# Patient Record
Sex: FEMALE | Race: WHITE | NOT HISPANIC OR LATINO
[De-identification: names, ages, dates, MRNs, and addresses within clinical notes are randomized per-mention and may not be internally consistent; named-entity substitution may affect disease eponyms.]

---

## 2017-03-17 ENCOUNTER — APPOINTMENT (OUTPATIENT)
Dept: VASCULAR SURGERY | Facility: CLINIC | Age: 57
End: 2017-03-17

## 2017-04-12 ENCOUNTER — APPOINTMENT (OUTPATIENT)
Dept: VASCULAR SURGERY | Facility: CLINIC | Age: 57
End: 2017-04-12

## 2017-04-12 VITALS
WEIGHT: 176 LBS | DIASTOLIC BLOOD PRESSURE: 97 MMHG | HEIGHT: 65 IN | TEMPERATURE: 98 F | SYSTOLIC BLOOD PRESSURE: 166 MMHG | HEART RATE: 92 BPM | BODY MASS INDEX: 29.32 KG/M2

## 2017-04-12 DIAGNOSIS — I86.8 VARICOSE VEINS OF OTHER SPECIFIED SITES: ICD-10-CM

## 2017-05-16 ENCOUNTER — RX RENEWAL (OUTPATIENT)
Age: 57
End: 2017-05-16

## 2017-07-07 ENCOUNTER — APPOINTMENT (OUTPATIENT)
Dept: VASCULAR SURGERY | Facility: CLINIC | Age: 57
End: 2017-07-07

## 2017-07-26 ENCOUNTER — APPOINTMENT (OUTPATIENT)
Dept: VASCULAR SURGERY | Facility: CLINIC | Age: 57
End: 2017-07-26

## 2017-07-26 VITALS — DIASTOLIC BLOOD PRESSURE: 91 MMHG | SYSTOLIC BLOOD PRESSURE: 197 MMHG

## 2017-07-26 VITALS — DIASTOLIC BLOOD PRESSURE: 93 MMHG | SYSTOLIC BLOOD PRESSURE: 173 MMHG

## 2017-07-26 VITALS
HEIGHT: 65 IN | HEART RATE: 70 BPM | SYSTOLIC BLOOD PRESSURE: 151 MMHG | DIASTOLIC BLOOD PRESSURE: 85 MMHG | TEMPERATURE: 98.3 F | WEIGHT: 165 LBS | BODY MASS INDEX: 27.49 KG/M2

## 2017-07-26 DIAGNOSIS — Z00.00 ENCOUNTER FOR GENERAL ADULT MEDICAL EXAMINATION W/OUT ABNORMAL FINDINGS: ICD-10-CM

## 2017-11-22 ENCOUNTER — APPOINTMENT (OUTPATIENT)
Dept: VASCULAR SURGERY | Facility: CLINIC | Age: 57
End: 2017-11-22

## 2019-10-11 ENCOUNTER — APPOINTMENT (OUTPATIENT)
Dept: VASCULAR SURGERY | Facility: CLINIC | Age: 59
End: 2019-10-11
Payer: MEDICARE

## 2019-10-11 VITALS
SYSTOLIC BLOOD PRESSURE: 134 MMHG | HEART RATE: 79 BPM | BODY MASS INDEX: 30.82 KG/M2 | DIASTOLIC BLOOD PRESSURE: 92 MMHG | TEMPERATURE: 97.6 F | WEIGHT: 185 LBS | HEIGHT: 65 IN

## 2019-10-11 VITALS — DIASTOLIC BLOOD PRESSURE: 76 MMHG | HEART RATE: 84 BPM | SYSTOLIC BLOOD PRESSURE: 151 MMHG

## 2019-10-11 VITALS — DIASTOLIC BLOOD PRESSURE: 83 MMHG | HEART RATE: 80 BPM | SYSTOLIC BLOOD PRESSURE: 182 MMHG

## 2019-10-11 PROCEDURE — 93923 UPR/LXTR ART STDY 3+ LVLS: CPT

## 2019-10-11 PROCEDURE — 93930 UPPER EXTREMITY STUDY: CPT

## 2019-10-11 PROCEDURE — 99214 OFFICE O/P EST MOD 30 MIN: CPT

## 2019-10-11 RX ORDER — MONTELUKAST SODIUM 10 MG/1
TABLET, FILM COATED ORAL
Refills: 0 | Status: ACTIVE | COMMUNITY

## 2019-10-11 RX ORDER — HYDROCHLOROTHIAZIDE 25 MG/1
25 TABLET ORAL
Refills: 0 | Status: ACTIVE | COMMUNITY

## 2019-10-11 RX ORDER — METOPROLOL TARTRATE 50 MG/1
50 TABLET, FILM COATED ORAL
Refills: 0 | Status: ACTIVE | COMMUNITY

## 2019-10-11 RX ORDER — AMLODIPINE BESYLATE 10 MG/1
10 TABLET ORAL
Refills: 0 | Status: ACTIVE | COMMUNITY

## 2019-10-11 NOTE — DATA REVIEWED
[FreeTextEntry1] : 7/5/2016 carotid Duplex and RETA/PVR to be mono by pt \par \par 7/8/2016 Carotid Duplex Rt ICA greater 80% stenosis Lt ICA 50% stenosis  [facility] flow\par \par 7/8/2016 RETA/PVR mild to mod RLE inflow dz and LLE mild to mod infraing art insuff  Rt reta .53 lt reta .66\par \par 9/21/2016 RLE Venous Duplex no dvt svt\par \par 7/26/2017  Carotid duplex Rt CEA restenosis 40-50% restenosis Lt ICA  less 50% stenosis [facility] flow\par \par 7/26/2017 RETA/PVR  brayan le moderate infragenic art occ dz rt reta .47 lt reta .58\par \par 10/11/2019 RETA/PVR  brayan le moderate infragenic art occ dz rt reta .48 lt reta .61\par \par 10/11/2019 Brayan UE Arterial Duplex  \par                                  Rt sig for  SA stenosis  less 50% stenosis (199cm/min) , \par                                                               Ax A  >75% stenosis (410 cm/min)\par                                  LUE sig for  Subclavian Art stenosis >50% stenosis (360 cm/min)\par \par \par

## 2019-10-11 NOTE — ASSESSMENT
[Arterial/Venous Disease] : arterial/venous disease [Medication Management] : medication management [Foot care/Footwear] : foot care/footwear [FreeTextEntry1] : Impression symptomatic arterial insuff  clinically stable and carotid stenosis and Jonny UE stenosis s/o great vessel dz currently asymptomatic w approx 30mm Hg difference \par \par [employer] management exercise program , protective measures, exercise program \par continue pletal 50 bid \par d/w pt that i will continue to monitor  jonny UE BP  and even though there is a difference in BP since she is asymptomatic will monitor if there are any sx or US findings then will need  CTA of throax  s/o SA stenosis\par  carotid duplex surveillance as per Dr Walker \par ov w reta/pvr s/o art insuff  and  jonny UE art duplex s/o ax a and SA stenosis 8 mo  june /2020\par ov 3 mo to re eval jonny  UE BP check and for sx if any changes will df/w pt to proceed w CTA Thorax s/o stenosis of great vessels \par rto if any changes or new c/o\par \par letter faxed to Dr ИВАН Encinas MD

## 2019-10-11 NOTE — PHYSICAL EXAM
[Normal Breath Sounds] : Normal breath sounds [Left Carotid Bruit] : left carotid bruit heard [Right Carotid Bruit] : right carotid bruit heard [2+] : right 2+ [Ankle Swelling Bilaterally] : bilaterally  [Ankle Swelling (On Exam)] : present [Varicose Veins Of Lower Extremities] : bilaterally [] : bilaterally [Ankle Swelling On The Right] : mild [No HSM] : no hepatosplenomegaly [Alert] : alert [No Rash or Lesion] : No rash or lesion [Oriented to Person] : oriented to person [Oriented to Place] : oriented to place [Oriented to Time] : oriented to time [Calm] : calm [JVD] : no jugular venous distention  [Tender] : was nontender [Abdomen Masses] : No abdominal masses [1+] : left 1+ [de-identified] : nad [Stool Sample Taken] : No stool obtained  on rectal exam [FreeTextEntry1] : Mild arterial insufficiency w mild trophic skin changes \par Mild bilateral eg venous insufficiency \par w mild bilateral leg stasis dermatitis \par and mild bilateral leg edema \par Multiple  bilateral leg small varicose  veins and spider veins calf and shin \par no wounds/ulcers\par no signs of brayan UE ischemia or lesions  [de-identified] : wnl [de-identified] : wnl [de-identified] : wnl [de-identified] : Jonny Cranial nerves 2-12 jonny grossly intact [de-identified] : cooperative

## 2019-10-11 NOTE — HISTORY OF PRESENT ILLNESS
[FreeTextEntry1] : pt c/o brayan le intermittent claudication at several  blocks unchanged from last ov, pt is on pletal  pt was advised to be eval for s/o subclavian artery stenosis  due to brayan ue  bp difference  pt is asymptomatic from ue standpoint  no sx of subclavian steal   [de-identified] : pt has not f/u in 2 years\par pt states Dr MALIHA Walker is performing carotid sureillance\par pt has no cerebrovasc c/o\par brayan ue bp difference remains w lue being lower\par pt c/o brayan le intermittent claudication at 5-6 blocks unchanged\par intensity mild to mod\par npt denies nocturnal leg or foot cramps \par pt is on pletal 50 bid

## 2020-02-21 ENCOUNTER — APPOINTMENT (OUTPATIENT)
Dept: VASCULAR SURGERY | Facility: CLINIC | Age: 60
End: 2020-02-21
Payer: MEDICARE

## 2020-02-21 VITALS — SYSTOLIC BLOOD PRESSURE: 135 MMHG | DIASTOLIC BLOOD PRESSURE: 84 MMHG

## 2020-02-21 VITALS — SYSTOLIC BLOOD PRESSURE: 179 MMHG | DIASTOLIC BLOOD PRESSURE: 84 MMHG

## 2020-02-21 VITALS
SYSTOLIC BLOOD PRESSURE: 189 MMHG | HEART RATE: 80 BPM | HEIGHT: 65 IN | TEMPERATURE: 98.1 F | DIASTOLIC BLOOD PRESSURE: 78 MMHG

## 2020-02-21 PROCEDURE — 99214 OFFICE O/P EST MOD 30 MIN: CPT

## 2020-02-21 NOTE — HISTORY OF PRESENT ILLNESS
[FreeTextEntry1] : pt c/o brayan le intermittent claudication at several  blocks unchanged from last ov, pt is on pletal  pt was advised to be eval for s/o subclavian artery stenosis  due to brayan ue  bp difference  pt is asymptomatic from ue standpoint  no sx of subclavian steal   [de-identified] : pt states Dr MALIHA Walker is performing carotid sureillance\par pt has no cerebrovasc c/o\par brayan ue bp difference remains w lue being lower\par pt c/o brayan le intermittent claudication at 5-6 blocks unchanged\par intensity mild to mod\par npt denies nocturnal leg or foot cramps \par pt is on pletal 50 bid

## 2020-02-21 NOTE — ASSESSMENT
[Arterial/Venous Disease] : arterial/venous disease [Medication Management] : medication management [Foot care/Footwear] : foot care/footwear [FreeTextEntry1] : Impression symptomatic arterial insuff  clinically stable and carotid stenosis and Jonny UE stenosis s/o great vessel dz currently asymptomatic w approx 40mm Hg difference \par \par [employer] management exercise program , protective measures, exercise program \par continue pletal 50 bid \par d/w pt that i will continue to monitor  jonny UE BP  and even though there is a difference in BP since she is asymptomatic will monitor if there are any sx or US findings then will need  CTA of throax  s/o SA stenosis\par continue carotid duplex surveillance due june 2020 \par ov w reta/pvr s/o art insuff  and  jonny UE art duplex s/o ax a and SA stenosis 8 mo  june /2020\par \par \par letter faxed to Dr ИВАН Encinas MD

## 2020-02-21 NOTE — PHYSICAL EXAM
[Normal Breath Sounds] : Normal breath sounds [Right Carotid Bruit] : right carotid bruit heard [Left Carotid Bruit] : left carotid bruit heard [2+] : right 2+ [1+] : left 1+ [Ankle Swelling Bilaterally] : bilaterally  [Ankle Swelling (On Exam)] : present [Varicose Veins Of Lower Extremities] : present [] : bilaterally [Ankle Swelling On The Right] : mild [No HSM] : no hepatosplenomegaly [No Rash or Lesion] : No rash or lesion [Alert] : alert [Oriented to Person] : oriented to person [Oriented to Place] : oriented to place [Oriented to Time] : oriented to time [Calm] : calm [JVD] : no jugular venous distention  [Abdomen Masses] : No abdominal masses [Tender] : was nontender [de-identified] : nad [Stool Sample Taken] : No stool obtained  on rectal exam [de-identified] : wnl [FreeTextEntry1] : Mild arterial insufficiency w mild trophic skin changes \par Mild bilateral eg venous insufficiency \par w mild bilateral leg stasis dermatitis \par and mild bilateral leg edema \par Multiple  bilateral leg small varicose  veins and spider veins calf and shin \par no wounds/ulcers\par no signs of brayan UE ischemia or lesions  [de-identified] : wnl [de-identified] : wnl [de-identified] : Jonny Cranial nerves 2-12 jonny grossly intact [de-identified] : cooperative

## 2020-02-21 NOTE — REASON FOR VISIT
[Follow-Up: _____] : a [unfilled] follow-up visit [FreeTextEntry1] : i have blood pressure difference in my arms

## 2020-06-12 ENCOUNTER — APPOINTMENT (OUTPATIENT)
Dept: VASCULAR SURGERY | Facility: CLINIC | Age: 60
End: 2020-06-12

## 2020-06-22 ENCOUNTER — APPOINTMENT (OUTPATIENT)
Dept: VASCULAR SURGERY | Facility: CLINIC | Age: 60
End: 2020-06-22
Payer: MEDICARE

## 2020-06-22 PROCEDURE — 93923 UPR/LXTR ART STDY 3+ LVLS: CPT

## 2020-06-22 PROCEDURE — 93930 UPPER EXTREMITY STUDY: CPT

## 2020-06-24 ENCOUNTER — APPOINTMENT (OUTPATIENT)
Dept: VASCULAR SURGERY | Facility: CLINIC | Age: 60
End: 2020-06-24
Payer: MEDICARE

## 2020-06-24 PROCEDURE — 99213 OFFICE O/P EST LOW 20 MIN: CPT | Mod: 95

## 2020-06-24 NOTE — DATA REVIEWED
[FreeTextEntry1] : 7/5/2016 carotid Duplex and RETA/PVR to be mono by pt \par \par 7/8/2016 Carotid Duplex Rt ICA greater 80% stenosis Lt ICA 50% stenosis  [facility]\par \par 7/8/2016 RETA/PVR mild to mod RLE inflow dz and LLE mild to mod infraing art insuff  Rt reta .53 lt reta .66\par \par 9/21/2016 RLE Venous Duplex no dvt svt\par \par 7/26/2017  Carotid duplex Rt CEA restenosis 40-50% restenosis Lt ICA  less 50% stenosis [facility] flow\par \par 7/26/2017 RETA/PVR  brayan le moderate infragenic art occ dz rt reta .47 lt reta .58\par \par 10/11/2019 RETA/PVR  brayan le moderate infragenic art occ dz rt reta .48 lt reta .61\par \par 10/11/2019 Bray[facility] UE Arterial Duplex  \par                                  Rt sig for  SA stenosis  less 50% stenosis (199cm/min) , \par                                                               Ax A  >75% stenosis (410 cm/min)\par                                  LUE sig for  Subclavian Art stenosis >50% stenosis (360 cm/min)\par \par 6/24/2020 RETA/PVR  Brayan le moderate infragenic art occ dz rt reta .5 lt reta .68\par \par 6/24/2020  Bray[facility] UE Arterial Duplex  \par                                  Rt sig for  SA stenosis  less 60-70% stenosis (338cm/min) , \par                                                               Ax A  50% stenosis (207 cm/min)\par                                  LUE sig for  Subclavian Art stenosis >50% stenosis (334 cm/min)\par                                                               Ax A  50% stenosis (241 cm/min)\par

## 2020-06-24 NOTE — ASSESSMENT
[FreeTextEntry1] : Impression symptomatic arterial insuff  clinically stable and carotid stenosis and Jonny UE stenosis s/o great vessel dz currently asymptomatic \par \par [employer] management exercise program , protective measures, exercise program \par continue pletal 50 bid \par d/w pt that i will continue to monitor  jonny UE BP  and even though there is a difference in BP since she is asymptomatic will monitor if there are any sx or US findings then will need  CTA of throax  s/o SA stenosis\par continue carotid duplex surveillance due june 2021\par telehealth  visit 6mo dec 2020\par reta/pvr s/o art insuff  12mo june 2021\par then ov w  jonny UE art duplex s/o ax a and SA stenosis 12 mo  june /2021\par Telehealth visit  time duration  15  min\par \par \par \par letter faxed to Dr ИВАН Encinas MD  [Arterial/Venous Disease] : arterial/venous disease [Foot care/Footwear] : foot care/footwear [Medication Management] : medication management

## 2020-06-24 NOTE — PHYSICAL EXAM
[JVD] : no jugular venous distention  [Right Carotid Bruit] : no bruit heard over the right carotid [Left Carotid Bruit] : no bruit heard over the left carotid [1+] : right 1+ [Ankle Swelling (On Exam)] : present [Ankle Swelling Bilaterally] : bilaterally  [Varicose Veins Of Lower Extremities] : bilaterally [] : present [Ankle Swelling On The Right] : mild [Alert] : alert [Oriented to Person] : oriented to person [Oriented to Place] : oriented to place [Oriented to Time] : oriented to time [Calm] : calm [de-identified] : nad [de-identified] : wnl [de-identified] : no resp distress [FreeTextEntry1] : Mild arterial insufficiency w mild trophic skin changes \par Mild bilateral eg venous insufficiency \par w mild bilateral leg stasis dermatitis \par and mild bilateral leg edema \par Multiple  bilateral leg small varicose  veins and spider veins calf and shin \par no wounds/ulcers\par no signs of brayan UE ischemia or lesions\par Physical exam and extremities exam findings via telehealth video review\par   [de-identified] : Jonny Cranial nerves 2-12 jonny grossly intact [de-identified] : wnl [de-identified] : cooperative

## 2020-06-24 NOTE — HISTORY OF PRESENT ILLNESS
[Home] : at home, [unfilled] , at the time of the visit. [Medical Office: ([facility])___] : at the medical office located in  [Other:____] : [unfilled] [Verbal consent obtained from patient] : the patient, [unfilled] [FreeTextEntry1] : pt c/o brayan le intermittent claudication at several  blocks unchanged from last ov, pt is on pletal  pt was advised to be eval for s/o subclavian artery stenosis  due to brayan ue  bp difference  pt is asymptomatic from ue standpoint  no sx of subclavian steal   [de-identified] : pt states Dr MALIHA Walker is performing carotid sureillance\par last surveillance june 2020 \par pt has no cerebrovasc c/o\par pt c/o brayan le intermittent claudication at 5-6 blocks unchanged\par intensity mild to mod\par pt denies nocturnal leg or foot cramps \par pt is on pletal 50 bid

## 2020-07-22 ENCOUNTER — TRANSCRIPTION ENCOUNTER (OUTPATIENT)
Age: 60
End: 2020-07-22

## 2021-08-16 ENCOUNTER — APPOINTMENT (OUTPATIENT)
Dept: VASCULAR SURGERY | Facility: CLINIC | Age: 61
End: 2021-08-16
Payer: COMMERCIAL

## 2021-08-16 DIAGNOSIS — I74.2 EMBOLISM AND THROMBOSIS OF ARTERIES OF THE UPPER EXTREMITIES: ICD-10-CM

## 2021-08-16 PROCEDURE — 93930 UPPER EXTREMITY STUDY: CPT

## 2021-08-16 PROCEDURE — 93923 UPR/LXTR ART STDY 3+ LVLS: CPT

## 2021-08-17 ENCOUNTER — APPOINTMENT (OUTPATIENT)
Dept: VASCULAR SURGERY | Facility: CLINIC | Age: 61
End: 2021-08-17
Payer: COMMERCIAL

## 2021-08-17 PROCEDURE — 93880 EXTRACRANIAL BILAT STUDY: CPT

## 2021-08-25 ENCOUNTER — APPOINTMENT (OUTPATIENT)
Dept: VASCULAR SURGERY | Facility: CLINIC | Age: 61
End: 2021-08-25

## 2021-09-22 ENCOUNTER — APPOINTMENT (OUTPATIENT)
Dept: VASCULAR SURGERY | Facility: CLINIC | Age: 61
End: 2021-09-22
Payer: COMMERCIAL

## 2021-09-22 DIAGNOSIS — I70.208 UNSPECIFIED ATHEROSCLEROSIS OF NATIVE ARTERIES OF EXTREMITIES, OTHER EXTREMITY: ICD-10-CM

## 2021-09-22 PROBLEM — I74.2: Status: ACTIVE | Noted: 2021-09-22

## 2021-09-22 PROCEDURE — 99443: CPT

## 2021-09-22 RX ORDER — SEMAGLUTIDE 1.34 MG/ML
INJECTION, SOLUTION SUBCUTANEOUS
Refills: 0 | Status: ACTIVE | COMMUNITY

## 2021-09-22 NOTE — PHYSICAL EXAM
[Right Carotid Bruit] : no bruit heard over the right carotid [Left Carotid Bruit] : no bruit heard over the left carotid [1+] : right 1+ [Alert] : alert [Oriented to Person] : oriented to person [Oriented to Place] : oriented to place [Oriented to Time] : oriented to time [Calm] : calm [de-identified] : no resp distress [FreeTextEntry1] : Physical exam and extremities exam findings via telehealth video review\par   [de-identified] : cooperative

## 2021-09-22 NOTE — ASSESSMENT
[FreeTextEntry1] : Impression symptomatic arterial insuff  clinically stable and carotid stenosis and Jonny UE stenosis s/o great vessel dz currently asymptomatic \par \par [employer] management exercise program , protective measures, exercise program \par continue pletal 50 bid \par d/w pt that i will continue to monitor  jonny UE BP  and even though there is a difference in BP since she is asymptomatic will monitor if there are any sx or US findings then will need  CTA of throax  s/o SA stenosis\par carotid duplex surveillance s/o stenosis and reta/pvr s/o art insuff 12mo sept 2022 then telehealth\par jonny UE art duplex s/o ax a and SA stenosis 6 mo  april 2022 will need close surveillance \par Telehealth visit  time duration  24  min\par \par \par \par letter faxed to Dr ИВАН Encinas MD  [Arterial/Venous Disease] : arterial/venous disease [Medication Management] : medication management [Foot care/Footwear] : foot care/footwear

## 2021-09-22 NOTE — HISTORY OF PRESENT ILLNESS
[FreeTextEntry1] : pt c/o brayan le intermittent claudication at several  blocks unchanged from last ov, pt is on pletal  pt was advised to be eval for s/o subclavian artery stenosis  due to brayan ue  bp difference  pt is asymptomatic from ue standpoint  no sx of subclavian steal   [de-identified] : pt has no cerebrovasc c/o\par pt states brayan le intermittent claudication  but cannot quantify distance but is able to   keep up w others and \par is able to perform activities of daily living \par intensity mild to mod\par pt denies nocturnal leg or foot cramps \par pt is on pletal 50 bid \par pt states no le wounds  [Home] : at home, [unfilled] , at the time of the visit. [Medical Office: ([facility])___] : at the medical office located in  [Other:____] : [unfilled] [Verbal consent obtained from patient] : the patient, [unfilled]

## 2021-09-22 NOTE — REASON FOR VISIT
[Home] : at home, [unfilled] , at the time of the visit. [Medical Office: ([facility])___] : at the medical office located in  [Other:____] : [unfilled] [Verbal consent obtained from patient] : the patient, [unfilled] [Follow-Up: _____] : a [unfilled] follow-up visit [FreeTextEntry1] : i have poor leg circulation in my arms, neck and legs

## 2021-09-22 NOTE — DATA REVIEWED
[FreeTextEntry1] : 7/5/2016 carotid Duplex and RETA/PVR to be mono by pt \par \par 7/8/2016 Carotid Duplex Rt ICA greater 80% stenosis Lt ICA 50% stenosis  [facility] flow\par \par 7/8/2016 RETA/PVR mild to mod RLE inflow dz and LLE mild to mod infraing art insuff  Rt reta .53 lt reta .66\par \par 9/21/2016 RLE Venous Duplex no dvt svt\par \par 7/26/2017  Carotid duplex Rt CEA restenosis 40-50% restenosis Lt ICA  less 50% stenosis [facility] flow\par \par 7/26/2017 RETA/PVR  brayan le moderate infragenic art occ dz rt reta .47 lt reta .58\par \par 10/11/2019 RETA/PVR  brayan le moderate infragenic art occ dz rt reta .48 lt rtea .61\par \par 10/11/2019 Brayan UE Arterial Duplex  \par                                  Rt sig for  SA stenosis  less 50% stenosis (199cm/min) , \par                                                               Ax A  >75% stenosis (410 cm/min)\par                                  LUE sig for  Subclavian Art stenosis >50% stenosis (360 cm/min)\par \par 6/24/2020 RETA/PVR  Brayan le moderate infragenic art occ dz rt reta .5 lt reta .68\par \par 6/24/2020  Brayan UE Arterial Duplex  \par                                  Rt sig for  SA stenosis  less 60-70% stenosis (338cm/min) , \par                                                               Ax A  50% stenosis (207 cm/min)\par                                  LUE sig for  Subclavian Art stenosis >50% stenosis (334 cm/min)\par                                                               Ax A  50% stenosis (241 cm/min)\par \par 8/16/2021 Carotid Duplex Rt CEA restenosis 40-50% restenosis (148/42) \par                                          Lt ICA   50% stenosis(197/40)\par                                          [facility] flow\par \par \par 8/16/2021 RETA/PVR  Brayan LE  moderate infragenic art occ dz rt reta .46 lt reta .55\par \par 8/17/2021 Brayan UE Arterial Duplex  \par                                  Rt sig for  SA stenosis about 70% stenosis (315cm/min) , \par                                                               Ax A  50% stenosis (109 cm/min)\par                                                               occluded rt radial artery\par                                  LUE sig for  Subclavian Art stenosis >70% stenosis (416 cm/min)\par                                                               Ax A  50% stenosis (256 cm/min)\par

## 2022-04-25 ENCOUNTER — RX RENEWAL (OUTPATIENT)
Age: 62
End: 2022-04-25

## 2022-09-12 ENCOUNTER — APPOINTMENT (OUTPATIENT)
Dept: VASCULAR SURGERY | Facility: CLINIC | Age: 62
End: 2022-09-12

## 2022-09-12 PROCEDURE — 93923 UPR/LXTR ART STDY 3+ LVLS: CPT

## 2022-09-21 ENCOUNTER — APPOINTMENT (OUTPATIENT)
Dept: VASCULAR SURGERY | Facility: CLINIC | Age: 62
End: 2022-09-21

## 2022-09-21 PROCEDURE — 99442: CPT

## 2022-09-21 NOTE — HISTORY OF PRESENT ILLNESS
[FreeTextEntry1] : pt c/o brayan le intermittent claudication at several  blocks unchanged from last ov, pt is on pletal  pt was advised to be eval for s/o subclavian artery stenosis  due to brayan ue  bp difference  pt is asymptomatic from ue standpoint  no sx of subclavian steal   [de-identified] : pt has no cerebrovasc c/o\par pt states brayan le intermittent claudication remains  but cannot quantify distance but is able to function \par is able to perform activities of daily living \par intensity mild to mod\par pt denies nocturnal leg or foot cramps \par pt is on pletal 50 bid \par pt states no le wounds \par pt denies ue c/o

## 2022-09-21 NOTE — DATA REVIEWED
[FreeTextEntry1] : 7/5/2016 carotid Duplex and RETA/PVR to be mono by pt \par \par 7/8/2016 Carotid Duplex Rt ICA greater 80% stenosis Lt ICA 50% stenosis  [facility] flow\par \par 7/8/2016 RETA/PVR mild to mod RLE inflow dz and LLE mild to mod infraing art insuff  Rt reta .53 lt reta .66\par \par 9/21/2016 RLE Venous Duplex no dvt svt\par \par 7/26/2017  Carotid duplex Rt CEA restenosis 40-50% restenosis Lt ICA  less 50% stenosis [facility] flow\par \par 7/26/2017 RETA/PVR  brayan le moderate infragenic art occ dz rt reta .47 lt reta .58\par \par 10/11/2019 RETA/PVR  brayan le moderate infragenic art occ dz rt reta .48 lt reta .61\par \par 10/11/2019 Brayan UE Arterial Duplex  \par                                  Rt sig for  SA stenosis  less 50% stenosis (199cm/min) , \par                                                               Ax A  >75% stenosis (410 cm/min)\par                                  LUE sig for  Subclavian Art stenosis >50% stenosis (360 cm/min)\par \par 6/24/2020 RETA/PVR  Brayan le moderate infragenic art occ dz rt reta .5 lt reta .68\par \par 6/24/2020  Brayan UE Arterial Duplex  \par                                  Rt sig for  SA stenosis  less 60-70% stenosis (338cm/min) , \par                                                               Ax A  50% stenosis (207 cm/min)\par                                  LUE sig for  Subclavian Art stenosis >50% stenosis (334 cm/min)\par                                                               Ax A  50% stenosis (241 cm/min)\par \par 8/16/2021 Carotid Duplex Rt CEA restenosis 40-50% restenosis (148/42) \par                                          Lt ICA   50% stenosis(197/40)\par                                          [facility] flow\par \par \par 8/16/2021 RETA/PVR  Brayan LE  moderate infragenic art occ dz rt reta .46 lt reta .55\par \par 8/17/2021 Brayan UE Arterial Duplex  \par                                  Rt sig for  SA stenosis about 70% stenosis (315cm/min) , \par                                                               Ax A  50% stenosis (109 cm/min)\par                                                               occluded rt radial artery\par                                  LUE sig for  Subclavian Art stenosis >70% stenosis (416 cm/min)\par                                                               Ax A  50% stenosis (256 cm/min)\par \par \par 5/5/2022  Outside facility study report reviewed AID  AA 2.2 cm normal\par \par \par 5/5/2022  Outside facility study report reviewed \par                                Carotid Duplex  \par                                 Rt CEA  less 50% stenosis  by velocity criteria\par                                  Lt  ICA  less 50% stenosis  by velocity criteria\par                                  Brayan Ant Vertebral Arterial Flow \par \par 9/12/2022 RETA/PVR  Brayan LE  moderate infragenic art occ dz rt reta .39 lt reta .56\par \par

## 2022-09-21 NOTE — ASSESSMENT
[FreeTextEntry1] : Impression symptomatic arterial insuff  clinically stable and carotid stenosis and Jonny UE stenosis s/o great vessel dz currently asymptomatic \par \par [employer] management exercise program , protective measures, exercise program \par continue pletal 50 bid \par d/w pt that i will continue to monitor  ojnny UE BP  and even though there is a difference in BP since she is asymptomatic will monitor if there are any sx or US findings then will need  CTA of throax  s/o SA stenosis\par reta/pvr s/o art insuff 12mo sept 2023 then telehealth\par rto for  jonny UE art duplex s/o ax a and SA stenosis  next avail then telehealth\par Telephonic visit  time duration  16  min\par \par \par \par letter faxed to Dr ИВАН Encinas MD  [Arterial/Venous Disease] : arterial/venous disease [Medication Management] : medication management [Foot care/Footwear] : foot care/footwear

## 2022-09-21 NOTE — REASON FOR VISIT
[Follow-Up: _____] : a [unfilled] follow-up visit [FreeTextEntry1] : My legs bother me still [Home] : at home, [unfilled] , at the time of the visit. [Medical Office: ([facility])___] : at the medical office located in  [Other:____] : [unfilled] [Verbal consent obtained from patient] : the patient, [unfilled]

## 2022-09-21 NOTE — PHYSICAL EXAM
[Right Carotid Bruit] : no bruit heard over the right carotid [Left Carotid Bruit] : no bruit heard over the left carotid [1+] : right 1+ [Alert] : alert [Oriented to Person] : oriented to person [Oriented to Place] : oriented to place [Oriented to Time] : oriented to time [Calm] : calm [de-identified] : no resp distress [FreeTextEntry1] : Physical exam and extremities exam findings via telephonic video review\par   [de-identified] : cooperative

## 2022-12-15 ENCOUNTER — APPOINTMENT (OUTPATIENT)
Dept: MRI IMAGING | Facility: CLINIC | Age: 62
End: 2022-12-15

## 2022-12-17 ENCOUNTER — OUTPATIENT (OUTPATIENT)
Dept: OUTPATIENT SERVICES | Facility: HOSPITAL | Age: 62
LOS: 1 days | End: 2022-12-17
Payer: COMMERCIAL

## 2022-12-17 ENCOUNTER — APPOINTMENT (OUTPATIENT)
Dept: MRI IMAGING | Facility: IMAGING CENTER | Age: 62
End: 2022-12-17

## 2022-12-17 DIAGNOSIS — Z98.89 OTHER SPECIFIED POSTPROCEDURAL STATES: Chronic | ICD-10-CM

## 2022-12-17 DIAGNOSIS — Z00.8 ENCOUNTER FOR OTHER GENERAL EXAMINATION: ICD-10-CM

## 2022-12-17 PROCEDURE — 73718 MRI LOWER EXTREMITY W/O DYE: CPT

## 2022-12-17 PROCEDURE — 73718 MRI LOWER EXTREMITY W/O DYE: CPT | Mod: 26,RT

## 2023-01-03 ENCOUNTER — APPOINTMENT (OUTPATIENT)
Dept: VASCULAR SURGERY | Facility: CLINIC | Age: 63
End: 2023-01-03
Payer: COMMERCIAL

## 2023-01-03 VITALS
HEIGHT: 65 IN | DIASTOLIC BLOOD PRESSURE: 65 MMHG | WEIGHT: 175 LBS | HEART RATE: 82 BPM | SYSTOLIC BLOOD PRESSURE: 121 MMHG | TEMPERATURE: 97.5 F | BODY MASS INDEX: 29.16 KG/M2

## 2023-01-03 PROCEDURE — 99213 OFFICE O/P EST LOW 20 MIN: CPT

## 2023-01-03 NOTE — HISTORY OF PRESENT ILLNESS
[FreeTextEntry1] : pt c/o brayan le intermittent claudication at several  blocks unchanged from last ov, pt is on pletal  pt was advised to be eval for s/o subclavian artery stenosis  due to brayan ue  bp difference  pt is asymptomatic from ue standpoint  no sx of subclavian steal   [de-identified] : pt is on pletal 50 bid \par pt states ongoing rt toe 2  wound w eschar\par mri of foot sig for  rt toe 2 OM

## 2023-01-03 NOTE — PHYSICAL EXAM
[1+] : right 1+ [Alert] : alert [Oriented to Person] : oriented to person [Oriented to Place] : oriented to place [Oriented to Time] : oriented to time [Calm] : calm [Right Carotid Bruit] : no bruit heard over the right carotid [Left Carotid Bruit] : no bruit heard over the left carotid [de-identified] : no resp distress [FreeTextEntry1] : Mild arterial insufficiency w mild  trophic skin changes \par rt toe 2 tip  wound w eschar 2mm \par no drainage \par  [de-identified] : wnl [de-identified] : Jonny Cranial nerves 2-12 jonny grossly intact [de-identified] : cooperative

## 2023-01-03 NOTE — ASSESSMENT
[Arterial/Venous Disease] : arterial/venous disease [Medication Management] : medication management [Foot care/Footwear] : foot care/footwear [FreeTextEntry1] : Impression  arterial insuff  w rt toe 2 ulcer \par \par [employer] management exercise program , protective measures, exercise program \par continue pletal 50 bid \par d/w pt that i will continue to monitor  brayan UE BP  and even though there is a difference in BP since she is asymptomatic will monitor if there are any sx or US findings then will need  CTA of throax  s/o SA stenosis\par reta/pvr s/o art insuff 12mo sept 2023 then telehealth\par rto for  brayan UE art duplex s/o ax a and SA stenosis  next avail then telehealth\par indications  risks and benefits of rle angio d/w pt who consents\par \par \par \par letter faxed to Dr ИВАН Encinas MD

## 2023-01-10 ENCOUNTER — APPOINTMENT (OUTPATIENT)
Dept: WOUND CARE | Facility: CLINIC | Age: 63
End: 2023-01-10
Payer: COMMERCIAL

## 2023-01-10 VITALS
TEMPERATURE: 98.1 F | RESPIRATION RATE: 18 BRPM | SYSTOLIC BLOOD PRESSURE: 148 MMHG | DIASTOLIC BLOOD PRESSURE: 82 MMHG | HEART RATE: 89 BPM

## 2023-01-10 VITALS — TEMPERATURE: 97.3 F

## 2023-01-10 DIAGNOSIS — M86.671 OTHER CHRONIC OSTEOMYELITIS, RIGHT ANKLE AND FOOT: ICD-10-CM

## 2023-01-10 DIAGNOSIS — I73.9 PERIPHERAL VASCULAR DISEASE, UNSPECIFIED: ICD-10-CM

## 2023-01-10 LAB
BASOPHILS # BLD AUTO: 0.06 K/UL
BASOPHILS NFR BLD AUTO: 0.5 %
CRP SERPL-MCNC: <3 MG/L
EOSINOPHIL # BLD AUTO: 0.27 K/UL
EOSINOPHIL NFR BLD AUTO: 2.4 %
ERYTHROCYTE [SEDIMENTATION RATE] IN BLOOD BY WESTERGREN METHOD: 34 MM/HR
HCT VFR BLD CALC: 46.1 %
HGB BLD-MCNC: 15.7 G/DL
IMM GRANULOCYTES NFR BLD AUTO: 0.7 %
LYMPHOCYTES # BLD AUTO: 2.35 K/UL
LYMPHOCYTES NFR BLD AUTO: 21.2 %
MAN DIFF?: NORMAL
MCHC RBC-ENTMCNC: 28.8 PG
MCHC RBC-ENTMCNC: 34.1 GM/DL
MCV RBC AUTO: 84.4 FL
MONOCYTES # BLD AUTO: 1.19 K/UL
MONOCYTES NFR BLD AUTO: 10.7 %
NEUTROPHILS # BLD AUTO: 7.15 K/UL
NEUTROPHILS NFR BLD AUTO: 64.5 %
PLATELET # BLD AUTO: 282 K/UL
RBC # BLD: 5.46 M/UL
RBC # FLD: 14.4 %
WBC # FLD AUTO: 11.1 K/UL

## 2023-01-10 PROCEDURE — 99203 OFFICE O/P NEW LOW 30 MIN: CPT

## 2023-01-12 DIAGNOSIS — Z01.812 ENCOUNTER FOR PREPROCEDURAL LABORATORY EXAMINATION: ICD-10-CM

## 2023-01-12 DIAGNOSIS — Z20.822 ENCOUNTER FOR PREPROCEDURAL LABORATORY EXAMINATION: ICD-10-CM

## 2023-01-19 ENCOUNTER — INPATIENT (INPATIENT)
Facility: HOSPITAL | Age: 63
LOS: 0 days | Discharge: HOME | End: 2023-01-20
Attending: SURGERY | Admitting: SURGERY
Payer: COMMERCIAL

## 2023-01-19 ENCOUNTER — APPOINTMENT (OUTPATIENT)
Dept: VASCULAR SURGERY | Facility: HOSPITAL | Age: 63
End: 2023-01-19

## 2023-01-19 VITALS
RESPIRATION RATE: 18 BRPM | TEMPERATURE: 99 F | HEIGHT: 65 IN | OXYGEN SATURATION: 100 % | HEART RATE: 95 BPM | WEIGHT: 177.25 LBS | DIASTOLIC BLOOD PRESSURE: 65 MMHG | SYSTOLIC BLOOD PRESSURE: 132 MMHG

## 2023-01-19 DIAGNOSIS — I77.1 STRICTURE OF ARTERY: ICD-10-CM

## 2023-01-19 DIAGNOSIS — Z98.89 OTHER SPECIFIED POSTPROCEDURAL STATES: Chronic | ICD-10-CM

## 2023-01-19 LAB
ANION GAP SERPL CALC-SCNC: 13 MMOL/L — SIGNIFICANT CHANGE UP (ref 7–14)
BUN SERPL-MCNC: 18 MG/DL — SIGNIFICANT CHANGE UP (ref 7–23)
CALCIUM SERPL-MCNC: 9.6 MG/DL — SIGNIFICANT CHANGE UP (ref 8.4–10.5)
CHLORIDE SERPL-SCNC: 100 MMOL/L — SIGNIFICANT CHANGE UP (ref 98–107)
CO2 SERPL-SCNC: 22 MMOL/L — SIGNIFICANT CHANGE UP (ref 22–31)
CREAT SERPL-MCNC: 0.87 MG/DL — SIGNIFICANT CHANGE UP (ref 0.5–1.3)
EGFR: 74 ML/MIN/1.73M2 — SIGNIFICANT CHANGE UP
GLUCOSE BLDC GLUCOMTR-MCNC: 155 MG/DL — HIGH (ref 70–99)
GLUCOSE BLDC GLUCOMTR-MCNC: 173 MG/DL — HIGH (ref 70–99)
GLUCOSE BLDC GLUCOMTR-MCNC: 188 MG/DL — HIGH (ref 70–99)
GLUCOSE BLDC GLUCOMTR-MCNC: 215 MG/DL — HIGH (ref 70–99)
GLUCOSE SERPL-MCNC: 162 MG/DL — HIGH (ref 70–99)
HCT VFR BLD CALC: 43.9 % — SIGNIFICANT CHANGE UP (ref 34.5–45)
HGB BLD-MCNC: 14.6 G/DL — SIGNIFICANT CHANGE UP (ref 11.5–15.5)
MAGNESIUM SERPL-MCNC: 1.8 MG/DL — SIGNIFICANT CHANGE UP (ref 1.6–2.6)
MCHC RBC-ENTMCNC: 28 PG — SIGNIFICANT CHANGE UP (ref 27–34)
MCHC RBC-ENTMCNC: 33.3 GM/DL — SIGNIFICANT CHANGE UP (ref 32–36)
MCV RBC AUTO: 84.1 FL — SIGNIFICANT CHANGE UP (ref 80–100)
NRBC # BLD: 0 /100 WBCS — SIGNIFICANT CHANGE UP (ref 0–0)
NRBC # FLD: 0 K/UL — SIGNIFICANT CHANGE UP (ref 0–0)
PLATELET # BLD AUTO: 248 K/UL — SIGNIFICANT CHANGE UP (ref 150–400)
POTASSIUM SERPL-MCNC: 4.6 MMOL/L — SIGNIFICANT CHANGE UP (ref 3.5–5.3)
POTASSIUM SERPL-SCNC: 4.6 MMOL/L — SIGNIFICANT CHANGE UP (ref 3.5–5.3)
RBC # BLD: 5.22 M/UL — HIGH (ref 3.8–5.2)
RBC # FLD: 13.6 % — SIGNIFICANT CHANGE UP (ref 10.3–14.5)
SODIUM SERPL-SCNC: 135 MMOL/L — SIGNIFICANT CHANGE UP (ref 135–145)
WBC # BLD: 11.05 K/UL — HIGH (ref 3.8–10.5)
WBC # FLD AUTO: 11.05 K/UL — HIGH (ref 3.8–10.5)

## 2023-01-19 PROCEDURE — 99222 1ST HOSP IP/OBS MODERATE 55: CPT | Mod: 25

## 2023-01-19 PROCEDURE — 37222: CPT | Mod: LT

## 2023-01-19 PROCEDURE — 36246 INS CATH ABD/L-EXT ART 2ND: CPT | Mod: LT,59

## 2023-01-19 PROCEDURE — 75710 ARTERY X-RAYS ARM/LEG: CPT | Mod: 26,RT

## 2023-01-19 PROCEDURE — 36140 INTRO NDL ICATH UPR/LXTR ART: CPT | Mod: RT

## 2023-01-19 PROCEDURE — 37221: CPT | Mod: 50

## 2023-01-19 PROCEDURE — 75625 CONTRAST EXAM ABDOMINL AORTA: CPT | Mod: 26

## 2023-01-19 PROCEDURE — 75716 ARTERY X-RAYS ARMS/LEGS: CPT | Mod: 26,59

## 2023-01-19 RX ORDER — DEXTROSE 50 % IN WATER 50 %
25 SYRINGE (ML) INTRAVENOUS ONCE
Refills: 0 | Status: DISCONTINUED | OUTPATIENT
Start: 2023-01-19 | End: 2023-01-20

## 2023-01-19 RX ORDER — SODIUM CHLORIDE 9 MG/ML
1000 INJECTION, SOLUTION INTRAVENOUS
Refills: 0 | Status: DISCONTINUED | OUTPATIENT
Start: 2023-01-19 | End: 2023-01-20

## 2023-01-19 RX ORDER — LOSARTAN POTASSIUM 100 MG/1
100 TABLET, FILM COATED ORAL DAILY
Refills: 0 | Status: DISCONTINUED | OUTPATIENT
Start: 2023-01-20 | End: 2023-01-20

## 2023-01-19 RX ORDER — GLUCAGON INJECTION, SOLUTION 0.5 MG/.1ML
1 INJECTION, SOLUTION SUBCUTANEOUS ONCE
Refills: 0 | Status: DISCONTINUED | OUTPATIENT
Start: 2023-01-19 | End: 2023-01-20

## 2023-01-19 RX ORDER — AMLODIPINE BESYLATE 2.5 MG/1
10 TABLET ORAL DAILY
Refills: 0 | Status: DISCONTINUED | OUTPATIENT
Start: 2023-01-19 | End: 2023-01-20

## 2023-01-19 RX ORDER — CILOSTAZOL 100 MG/1
50 TABLET ORAL
Refills: 0 | Status: DISCONTINUED | OUTPATIENT
Start: 2023-01-19 | End: 2023-01-20

## 2023-01-19 RX ORDER — INSULIN LISPRO 100/ML
VIAL (ML) SUBCUTANEOUS
Refills: 0 | Status: DISCONTINUED | OUTPATIENT
Start: 2023-01-19 | End: 2023-01-20

## 2023-01-19 RX ORDER — INSULIN LISPRO 100/ML
VIAL (ML) SUBCUTANEOUS AT BEDTIME
Refills: 0 | Status: DISCONTINUED | OUTPATIENT
Start: 2023-01-19 | End: 2023-01-20

## 2023-01-19 RX ORDER — SODIUM CHLORIDE 9 MG/ML
1000 INJECTION INTRAMUSCULAR; INTRAVENOUS; SUBCUTANEOUS
Refills: 0 | Status: DISCONTINUED | OUTPATIENT
Start: 2023-01-19 | End: 2023-01-20

## 2023-01-19 RX ORDER — METOPROLOL TARTRATE 50 MG
50 TABLET ORAL DAILY
Refills: 0 | Status: DISCONTINUED | OUTPATIENT
Start: 2023-01-19 | End: 2023-01-20

## 2023-01-19 RX ORDER — DEXTROSE 50 % IN WATER 50 %
15 SYRINGE (ML) INTRAVENOUS ONCE
Refills: 0 | Status: DISCONTINUED | OUTPATIENT
Start: 2023-01-19 | End: 2023-01-20

## 2023-01-19 RX ORDER — CLOPIDOGREL BISULFATE 75 MG/1
75 TABLET, FILM COATED ORAL DAILY
Refills: 0 | Status: DISCONTINUED | OUTPATIENT
Start: 2023-01-19 | End: 2023-01-20

## 2023-01-19 RX ORDER — MONTELUKAST 4 MG/1
10 TABLET, CHEWABLE ORAL DAILY
Refills: 0 | Status: DISCONTINUED | OUTPATIENT
Start: 2023-01-19 | End: 2023-01-20

## 2023-01-19 RX ORDER — SODIUM CHLORIDE 9 MG/ML
3 INJECTION INTRAMUSCULAR; INTRAVENOUS; SUBCUTANEOUS EVERY 8 HOURS
Refills: 0 | Status: DISCONTINUED | OUTPATIENT
Start: 2023-01-19 | End: 2023-01-20

## 2023-01-19 RX ORDER — DEXTROSE 50 % IN WATER 50 %
12.5 SYRINGE (ML) INTRAVENOUS ONCE
Refills: 0 | Status: DISCONTINUED | OUTPATIENT
Start: 2023-01-19 | End: 2023-01-20

## 2023-01-19 RX ORDER — ATORVASTATIN CALCIUM 80 MG/1
40 TABLET, FILM COATED ORAL AT BEDTIME
Refills: 0 | Status: DISCONTINUED | OUTPATIENT
Start: 2023-01-19 | End: 2023-01-20

## 2023-01-19 RX ADMIN — SODIUM CHLORIDE 3 MILLILITER(S): 9 INJECTION INTRAMUSCULAR; INTRAVENOUS; SUBCUTANEOUS at 14:52

## 2023-01-19 RX ADMIN — CILOSTAZOL 50 MILLIGRAM(S): 100 TABLET ORAL at 21:28

## 2023-01-19 RX ADMIN — Medication 50 MILLIGRAM(S): at 21:29

## 2023-01-19 RX ADMIN — CLOPIDOGREL BISULFATE 75 MILLIGRAM(S): 75 TABLET, FILM COATED ORAL at 21:28

## 2023-01-19 RX ADMIN — Medication 1: at 17:33

## 2023-01-19 RX ADMIN — SODIUM CHLORIDE 50 MILLILITER(S): 9 INJECTION INTRAMUSCULAR; INTRAVENOUS; SUBCUTANEOUS at 14:52

## 2023-01-19 RX ADMIN — MONTELUKAST 10 MILLIGRAM(S): 4 TABLET, CHEWABLE ORAL at 21:29

## 2023-01-19 RX ADMIN — SODIUM CHLORIDE 3 MILLILITER(S): 9 INJECTION INTRAMUSCULAR; INTRAVENOUS; SUBCUTANEOUS at 21:22

## 2023-01-19 RX ADMIN — ATORVASTATIN CALCIUM 40 MILLIGRAM(S): 80 TABLET, FILM COATED ORAL at 21:28

## 2023-01-19 NOTE — H&P CARDIOLOGY - ATTENDING COMMENTS
Eugene Harrison MD performed a history and physical exam of the patient and discussed  the findings and plan with the house officer. I reviewed the resident note and agree with the findings and plan   I Eugene Harrison MD have personally seen and examined the patient at bedside today at  11 am

## 2023-01-19 NOTE — H&P CARDIOLOGY - COMMENTS
Informed consent to be obtained by attending doing the case HEIDI Harrison MD performed a history and physical exam of the patient and discussed  the findings and plan with the house officer. I reviewed the resident note and agree with the findings and plan   I Eugene Harrison MD have personally seen and examined the patient at bedside today at  11 am     HEIDI Harrison MD explained the indications risks and benefits of   right leg angiogram, possible endovascular intervention,   possible placement of drug eluting stent to patient who understands and consents

## 2023-01-19 NOTE — H&P CARDIOLOGY - HISTORY OF PRESENT ILLNESS
63 y/o F with PMH of HTN, HLD, DM type II, KENNETH(s/p right CEA), PAD, Subclavian artery stenosis presented to [facility] for RLE angiogram for a non healing right 2nd toe wound. As the patient she developed initially bilateral leg cramps in 2014 which was worse with exertion and also present at rest. Patient stated that the cramps were in her calves bilaterally and would resolve immediately when she would take a rest from walking. Patient stated that few months ago she developed a blister on her right 2nd toe for which she was seen by podiatrist and had debridement and treated with antibiotics. Patient stated that she does not have any drainage from the wound but there is a "small hole still present without any drainage". Patient otherwise denied any fevers, chills, LE swelling or contralateral LE wounds. Patient stated that after starting the Cilostazol her rest claudication has completely resolved. Patient otherwise denied any CP, SOB, N/V/D/C, abdominal pain, dysuria, melena, hematochezia, recent travel, sick contact, cough, body aches, pleuritic or positional chest pain.     COVID PCR not detected on 01/16/23 63 y/o F with PMH of HTN, HLD, DM type II, KENNETH(s/p right CEA), PAD, Subclavian artery stenosis presented to [facility] for RLE angiogram for a non healing right 2nd toe wound. As the patient she developed initially bilateral leg cramps in 2014 which was worse with exertion and also present at rest. Patient stated that the cramps were in her calves bilaterally and would resolve immediately when she would take a rest from walking. Patient stated that few months ago she developed a blister on her right 2nd toe for which she was seen by podiatrist and had debridement and treated with antibiotics. Patient stated that she does not have any drainage from the wound but there is a "small hole still present without any drainage". Patient otherwise denied any fevers, chills, LE swelling or contralateral LE wounds. Patient stated that after starting the Cilostazol her rest claudication has completely resolved. Patient otherwise denied any CP, SOB, N/V/D/C, abdominal pain, dysuria, melena, hematochezia, recent travel, sick contact, cough, body aches, pleuritic or positional chest pain.     COVID PCR not detected on 01/16/23  MRI sig for rt toe OM

## 2023-01-19 NOTE — PATIENT PROFILE ADULT - FALL HARM RISK - HARM RISK INTERVENTIONS

## 2023-01-19 NOTE — H&P CARDIOLOGY - NSICDXPASTMEDICALHX_GEN_ALL_CORE_FT
PAST MEDICAL HISTORY:  Carotid stenosis, right     Diabetes type 2, diagnosed at age 40    Former smoker     Hiatal hernia     Hyperlipidemia high Triglicerides, not on any medications    Hypertension     Mitral valve prolapse dx in 1978    Peripheral vascular disease

## 2023-01-19 NOTE — H&P CARDIOLOGY - NSICDXFAMILYHX_GEN_ALL_CORE_FT
FAMILY HISTORY:  Father  Still living? No  Family history of cerebrovascular accident (CVA), Age at diagnosis: Age Unknown  Family history of diabetes mellitus, Age at diagnosis: Age Unknown    Mother  Still living? No  Family history of carotid artery stenosis, Age at diagnosis: Age Unknown  Family history of vascular disease, Age at diagnosis: Age Unknown

## 2023-01-20 ENCOUNTER — TRANSCRIPTION ENCOUNTER (OUTPATIENT)
Age: 63
End: 2023-01-20

## 2023-01-20 VITALS
TEMPERATURE: 98 F | OXYGEN SATURATION: 94 % | RESPIRATION RATE: 16 BRPM | HEART RATE: 66 BPM | SYSTOLIC BLOOD PRESSURE: 109 MMHG | DIASTOLIC BLOOD PRESSURE: 54 MMHG

## 2023-01-20 DIAGNOSIS — I74.09 OTHER ARTERIAL EMBOLISM AND THROMBOSIS OF ABDOMINAL AORTA: ICD-10-CM

## 2023-01-20 DIAGNOSIS — I77.1 STRICTURE OF ARTERY: ICD-10-CM

## 2023-01-20 DIAGNOSIS — I96 GANGRENE, NOT ELSEWHERE CLASSIFIED: ICD-10-CM

## 2023-01-20 LAB
ANION GAP SERPL CALC-SCNC: 13 MMOL/L — SIGNIFICANT CHANGE UP (ref 7–14)
BUN SERPL-MCNC: 17 MG/DL — SIGNIFICANT CHANGE UP (ref 7–23)
CALCIUM SERPL-MCNC: 8.9 MG/DL — SIGNIFICANT CHANGE UP (ref 8.4–10.5)
CHLORIDE SERPL-SCNC: 100 MMOL/L — SIGNIFICANT CHANGE UP (ref 98–107)
CO2 SERPL-SCNC: 21 MMOL/L — LOW (ref 22–31)
CREAT SERPL-MCNC: 0.79 MG/DL — SIGNIFICANT CHANGE UP (ref 0.5–1.3)
EGFR: 83 ML/MIN/1.73M2 — SIGNIFICANT CHANGE UP
GLUCOSE BLDC GLUCOMTR-MCNC: 155 MG/DL — HIGH (ref 70–99)
GLUCOSE SERPL-MCNC: 137 MG/DL — HIGH (ref 70–99)
HCT VFR BLD CALC: 39.7 % — SIGNIFICANT CHANGE UP (ref 34.5–45)
HGB BLD-MCNC: 12.9 G/DL — SIGNIFICANT CHANGE UP (ref 11.5–15.5)
MAGNESIUM SERPL-MCNC: 1.7 MG/DL — SIGNIFICANT CHANGE UP (ref 1.6–2.6)
MCHC RBC-ENTMCNC: 27.9 PG — SIGNIFICANT CHANGE UP (ref 27–34)
MCHC RBC-ENTMCNC: 32.5 GM/DL — SIGNIFICANT CHANGE UP (ref 32–36)
MCV RBC AUTO: 85.7 FL — SIGNIFICANT CHANGE UP (ref 80–100)
NRBC # BLD: 0 /100 WBCS — SIGNIFICANT CHANGE UP (ref 0–0)
NRBC # FLD: 0 K/UL — SIGNIFICANT CHANGE UP (ref 0–0)
PLATELET # BLD AUTO: 242 K/UL — SIGNIFICANT CHANGE UP (ref 150–400)
POTASSIUM SERPL-MCNC: 3.4 MMOL/L — LOW (ref 3.5–5.3)
POTASSIUM SERPL-SCNC: 3.4 MMOL/L — LOW (ref 3.5–5.3)
RBC # BLD: 4.63 M/UL — SIGNIFICANT CHANGE UP (ref 3.8–5.2)
RBC # FLD: 14 % — SIGNIFICANT CHANGE UP (ref 10.3–14.5)
SODIUM SERPL-SCNC: 134 MMOL/L — LOW (ref 135–145)
WBC # BLD: 15.67 K/UL — HIGH (ref 3.8–10.5)
WBC # FLD AUTO: 15.67 K/UL — HIGH (ref 3.8–10.5)

## 2023-01-20 PROCEDURE — 99232 SBSQ HOSP IP/OBS MODERATE 35: CPT

## 2023-01-20 RX ORDER — MAGNESIUM SULFATE 500 MG/ML
1 VIAL (ML) INJECTION ONCE
Refills: 0 | Status: COMPLETED | OUTPATIENT
Start: 2023-01-20 | End: 2023-01-20

## 2023-01-20 RX ORDER — CLOPIDOGREL BISULFATE 75 MG/1
1 TABLET, FILM COATED ORAL
Qty: 30 | Refills: 0
Start: 2023-01-20 | End: 2023-02-18

## 2023-01-20 RX ORDER — POTASSIUM CHLORIDE 20 MEQ
20 PACKET (EA) ORAL ONCE
Refills: 0 | Status: COMPLETED | OUTPATIENT
Start: 2023-01-20 | End: 2023-01-20

## 2023-01-20 RX ADMIN — Medication 1: at 07:40

## 2023-01-20 RX ADMIN — LOSARTAN POTASSIUM 100 MILLIGRAM(S): 100 TABLET, FILM COATED ORAL at 06:10

## 2023-01-20 RX ADMIN — CILOSTAZOL 50 MILLIGRAM(S): 100 TABLET ORAL at 06:10

## 2023-01-20 RX ADMIN — Medication 100 GRAM(S): at 07:40

## 2023-01-20 RX ADMIN — Medication 20 MILLIEQUIVALENT(S): at 07:39

## 2023-01-20 RX ADMIN — SODIUM CHLORIDE 50 MILLILITER(S): 9 INJECTION INTRAMUSCULAR; INTRAVENOUS; SUBCUTANEOUS at 06:09

## 2023-01-20 RX ADMIN — AMLODIPINE BESYLATE 10 MILLIGRAM(S): 2.5 TABLET ORAL at 06:10

## 2023-01-20 RX ADMIN — SODIUM CHLORIDE 3 MILLILITER(S): 9 INJECTION INTRAMUSCULAR; INTRAVENOUS; SUBCUTANEOUS at 05:59

## 2023-01-20 NOTE — PROGRESS NOTE ADULT - SUBJECTIVE AND OBJECTIVE BOX
POST-OPERATIVE NOTE    Patient is s/p angiogram with drug eluting stent    Subjective:  Patient reports no pain at the incisional site  Denies chest pain, shortness of breath, nausea, vomiting    Vital Signs Last 24 Hrs  T(C): 36.9 (20 Jan 2023 01:37), Max: 37.1 (19 Jan 2023 21:00)  T(F): 98.5 (20 Jan 2023 01:37), Max: 98.7 (19 Jan 2023 21:00)  HR: 91 (20 Jan 2023 01:37) (91 - 95)  BP: 135/60 (20 Jan 2023 01:37) (132/65 - 135/60)  BP(mean): --  RR: 17 (20 Jan 2023 01:37) (17 - 18)  SpO2: 97% (20 Jan 2023 01:37) (97% - 100%)    Parameters below as of 20 Jan 2023 01:37  Patient On (Oxygen Delivery Method): room air    I&O's Detail    Physical Exam:  General: NAD, resting comfortably in bed  Pulmonary: Nonlabored breathing, no respiratory distress  Abdominal: soft, appropriately tender, nondistended, elastic abdominal band in place dressing not soiled  Extremities: [facility]      LABS:                        14.6   11.05 )-----------( 248      ( 19 Jan 2023 10:00 )             43.9     01-19    135  |  100  |  18  ----------------------------<  162<H>  4.6   |  22  |  0.87    Ca    9.6      19 Jan 2023 10:00  Mg     1.80     01-19      Assessment:  The patient is a 62y Female who is now several hours post-op from an angiogram with drug eluting stent    Plan:  - Pain control as needed  - plavix 75 qd  - tolerating carb consistent diet  - OOB and ambulating as tolerated  - F/u AM labs

## 2023-01-20 NOTE — DISCHARGE NOTE NURSING/CASE MANAGEMENT/SOCIAL WORK - NSDCPEFALRISK_GEN_ALL_CORE
For information on Fall & Injury Prevention, visit: [email or web address] OR  [email or web address] OR  https://www.cdc.gov/steadi/patient.html

## 2023-01-20 NOTE — DISCHARGE NOTE PROVIDER - NSDCCPTREATMENT_GEN_ALL_CORE_FT
PRINCIPAL PROCEDURE  Procedure: Insertion of stent into bilateral iliac vessels  Findings and Treatment:

## 2023-01-20 NOTE — DISCHARGE NOTE PROVIDER - NSDCCPCAREPLAN_GEN_ALL_CORE_FT
PRINCIPAL DISCHARGE DIAGNOSIS  Diagnosis: Bilateral iliac artery stenosis  Assessment and Plan of Treatment:

## 2023-01-20 NOTE — DISCHARGE NOTE PROVIDER - CARE PROVIDER_API CALL
Eugene Harrison)  Vascular Surgery  [address], [address]  [place] [address]  Phone: [phone]  Fax: [phone]  Follow Up Time: 2 weeks

## 2023-01-20 NOTE — DISCHARGE NOTE PROVIDER - NSDCMRMEDTOKEN_GEN_ALL_CORE_FT
amLODIPine 10 mg oral tablet: 1 tab(s) orally once a day  atorvastatin 40 mg oral tablet: 1 tab(s) orally once a day  cilostazol 50 mg oral tablet: 1 tab(s) orally 2 times a day  clopidogrel 75 mg oral tablet: 1 tab(s) orally once a day  glipiZIDE 5 mg oral tablet, extended release: 1 tab(s) orally 3 times a day  hydroCHLOROthiazide 25 mg oral tablet: 1 tab(s) orally once a day  Jardiance 10 mg oral tablet: 1 tab(s) orally once a day (in the morning)  losartan 100 mg oral tablet: 1 tab(s) orally once a day  Metoprolol Succinate ER 50 mg oral tablet, extended release: 1 tab(s) orally once a day  montelukast 10 mg oral tablet: 1 tab(s) orally once a day  Ozempic 2 mg/1.5 mL (0.25 mg or 0.5 mg dose) subcutaneous solution: Once a week on Wednesday

## 2023-01-20 NOTE — PROGRESS NOTE ADULT - ASSESSMENT
ASSESSMENT:  63 y/o female with PMHx HTN, DM, PAD now s/p b/l LE angiogram with iliac drug eluting stents on 1/19/23.       PLAN:  - Diet: Regular  - f/u AM labs, replete prn  - continue home meds  - monitor hemodynamics   - Pletal and Plavix, no ASA per attending   - Dispo: home today     Vascular surgery  [id number]   ASSESSMENT:  61 y/o female with PMHx HTN, DM, PAD now s/p b/l LE angiogram with iliac drug eluting stents on 1/19/23.       PLAN:  - Diet: Regular  - f/u AM labs, replete prn  - continue home meds  - monitor hemodynamics   - Pletal and Plavix, no ASA per attending   f/u as outpt to re eval of le art insuff and  rt toe wound/gangrene progression   - Dispo: home today     Vascular surgery  [id number]

## 2023-01-20 NOTE — PROGRESS NOTE ADULT - PROBLEM SELECTOR PLAN 2
I Eugene Harrison MD have seen and examined the patient today and agree with  the  evaluation, assessment and plan of the surgical house officer  HEIDI Harrison MD have personally seen and examined the patient at bedside today at  8am

## 2023-01-20 NOTE — DISCHARGE NOTE PROVIDER - HOSPITAL COURSE
61 y/o F with PMH of HTN, HLD, DM type II, KENNETH (s/p right CEA), PAD, Subclavian artery stenosis presented to [facility] 1/19 for RLE angiogram for a non healing right 2nd toe wound.   Patient underwent bilateral iliac stent placement. Patient tolerated procedure well and there were no post-operative complications identified. Patient remained hemodynamically stable and was transferred to the surgical floor. Diet was restarted and advanced as tolerated. Pain control was transitioned from IV to PO pain meds. At this time, patient is currently ambulating, voiding, tolerating a regular diet. Pain well controlled on PO pain meds. Patient felt safe with being discharged, and understood and agreed with plan. Per Dr. Harrison, patient is stable for discharge to home with outpatient follow up. 61 y/o F with PMH of HTN, HLD, DM type II, KENNETH (s/p right CEA), PAD, Subclavian artery stenosis presented to [facility] 1/19 for RLE angiogram for a non healing right 2nd toe wound.   Patient underwent bilateral iliac stent placement. Patient tolerated procedure well and there were no post-operative complications identified. Patient remained hemodynamically stable and was transferred to the surgical floor. Diet was restarted and advanced as tolerated. Pain control was transitioned from IV to PO pain meds. At this time, patient is currently ambulating, voiding, tolerating a regular diet. Pain well controlled on PO pain meds. Patient felt safe with being discharged, and understood and agreed with plan. Per Dr. Harrison, patient is stable for discharge to home with outpatient follow up.

## 2023-01-20 NOTE — DISCHARGE NOTE NURSING/CASE MANAGEMENT/SOCIAL WORK - PATIENT PORTAL LINK FT
You can access the FollowMyHealth Patient Portal offered by [facility] by registering at the following website: [email or web address]. By joining [email or web address]’s FollowMyHealth portal, you will also be able to view your health information using other applications (apps) compatible with our system.

## 2023-01-20 NOTE — PROGRESS NOTE ADULT - SUBJECTIVE AND OBJECTIVE BOX
Vascular Surgery Daily Progress Note  =====================================================    SUBJECTIVE: Patient seen and examined at bedside on AM rounds. Patient reports that they're feeling well, ready to go home today. denies any groin or leg pain Tolerating diet, denies nausea/vomiting.  Denies fever, chills, chest pain, SOB.     PAST MEDICAL & SURGICAL HISTORY:  Peripheral vascular disease  Diabetes  type 2, diagnosed at age 40  Hypertension  Former smoker  Hyperlipidemia  Mitral valve prolapse  dx in 1978  Carotid stenosis, right  Hiatal hernia  S/P D&C (status post dilation and curettage)          ALLERGIES:  No Known Allergies    --------------------------------------------------------------------------------------    MEDICATIONS:    Neurologic Medications    Respiratory Medications  montelukast 10 milliGRAM(s) Oral daily    Cardiovascular Medications  amLODIPine   Tablet 10 milliGRAM(s) Oral daily  hydrochlorothiazide 25 milliGRAM(s) Oral daily  losartan 100 milliGRAM(s) Oral daily  metoprolol succinate ER 50 milliGRAM(s) Oral daily    Gastrointestinal Medications  dextrose 5%. 1000 milliLiter(s) IV Continuous <Continuous>  dextrose 5%. 1000 milliLiter(s) IV Continuous <Continuous>  magnesium sulfate  IVPB 1 Gram(s) IV Intermittent once  potassium chloride    Tablet ER 20 milliEquivalent(s) Oral once  sodium chloride 0.9% lock flush 3 milliLiter(s) IV Push every 8 hours  sodium chloride 0.9%. 1000 milliLiter(s) IV Continuous <Continuous>    Genitourinary Medications    Hematologic/Oncologic Medications  cilostazol 50 milliGRAM(s) Oral two times a day  clopidogrel Tablet 75 milliGRAM(s) Oral daily    Antimicrobial/Immunologic Medications    Endocrine/Metabolic Medications  atorvastatin 40 milliGRAM(s) Oral at bedtime  dextrose 50% Injectable 25 Gram(s) IV Push once  dextrose 50% Injectable 12.5 Gram(s) IV Push once  dextrose 50% Injectable 25 Gram(s) IV Push once  dextrose Oral Gel 15 Gram(s) Oral once PRN Blood Glucose LESS THAN 70 milliGRAM(s)/deciliter  glucagon  Injectable 1 milliGRAM(s) IntraMuscular once  insulin lispro (ADMELOG) corrective regimen sliding scale   SubCutaneous three times a day before meals  insulin lispro (ADMELOG) corrective regimen sliding scale   SubCutaneous at bedtime    Topical/Other Medications    --------------------------------------------------------------------------------------    VITAL SIGNS:  T(C): 37.2 (01-20-23 @ 06:00), Max: 37.2 (01-20-23 @ 06:00)  HR: 92 (01-20-23 @ 06:00) (91 - 95)  BP: 136/80 (01-20-23 @ 06:00) (132/65 - 136/80)  RR: 18 (01-20-23 @ 06:00) (17 - 18)  SpO2: 98% (01-20-23 @ 06:00) (97% - 100%)  --------------------------------------------------------------------------------------    EXAM    General: NAD, resting in bed comfortably. A&Ox3.   Cardiac: regular rate  Respiratory: Nonlabored respirations, normal cw expansion.  Abdomen: soft, nontender, nondistended.   Extremities: moving extremities  Vascular: groins soft without signs of hematoma or ecchymosis. DP/PT signals b/l.     --------------------------------------------------------------------------------------    LABS                          12.9   15.67 )-----------( 242      ( 20 Jan 2023 05:40 )             39.7     01-20    134<L>  |  100  |  17  ----------------------------<  137<H>  3.4<L>   |  21<L>  |  0.79    Ca    8.9      20 Jan 2023 05:40  Mg     1.70     01-20        --------------------------------------------------------------------------------------       Vascular Surgery Daily Progress Note  =====================================================    SUBJECTIVE: Patient seen and examined at bedside on AM rounds. Patient reports that they're feeling well, ready to go home today. denies any groin or leg pain Tolerating diet, denies nausea/vomiting.  Denies fever, chills, chest pain, SOB.     PAST MEDICAL & SURGICAL HISTORY:  Peripheral vascular disease  Diabetes  type 2, diagnosed at age 40  Hypertension  Former smoker  Hyperlipidemia  Mitral valve prolapse  dx in 1978  Carotid stenosis, right  Hiatal hernia  S/P D&C (status post dilation and curettage)          ALLERGIES:  No Known Allergies    --------------------------------------------------------------------------------------    MEDICATIONS:    Neurologic Medications    Respiratory Medications  montelukast 10 milliGRAM(s) Oral daily    Cardiovascular Medications  amLODIPine   Tablet 10 milliGRAM(s) Oral daily  hydrochlorothiazide 25 milliGRAM(s) Oral daily  losartan 100 milliGRAM(s) Oral daily  metoprolol succinate ER 50 milliGRAM(s) Oral daily    Gastrointestinal Medications  dextrose 5%. 1000 milliLiter(s) IV Continuous <Continuous>  dextrose 5%. 1000 milliLiter(s) IV Continuous <Continuous>  magnesium sulfate  IVPB 1 Gram(s) IV Intermittent once  potassium chloride    Tablet ER 20 milliEquivalent(s) Oral once  sodium chloride 0.9% lock flush 3 milliLiter(s) IV Push every 8 hours  sodium chloride 0.9%. 1000 milliLiter(s) IV Continuous <Continuous>    Genitourinary Medications    Hematologic/Oncologic Medications  cilostazol 50 milliGRAM(s) Oral two times a day  clopidogrel Tablet 75 milliGRAM(s) Oral daily    Antimicrobial/Immunologic Medications    Endocrine/Metabolic Medications  atorvastatin 40 milliGRAM(s) Oral at bedtime  dextrose 50% Injectable 25 Gram(s) IV Push once  dextrose 50% Injectable 12.5 Gram(s) IV Push once  dextrose 50% Injectable 25 Gram(s) IV Push once  dextrose Oral Gel 15 Gram(s) Oral once PRN Blood Glucose LESS THAN 70 milliGRAM(s)/deciliter  glucagon  Injectable 1 milliGRAM(s) IntraMuscular once  insulin lispro (ADMELOG) corrective regimen sliding scale   SubCutaneous three times a day before meals  insulin lispro (ADMELOG) corrective regimen sliding scale   SubCutaneous at bedtime    Topical/Other Medications    --------------------------------------------------------------------------------------    VITAL SIGNS:  T(C): 37.2 (01-20-23 @ 06:00), Max: 37.2 (01-20-23 @ 06:00)  HR: 92 (01-20-23 @ 06:00) (91 - 95)  BP: 136/80 (01-20-23 @ 06:00) (132/65 - 136/80)  RR: 18 (01-20-23 @ 06:00) (17 - 18)  SpO2: 98% (01-20-23 @ 06:00) (97% - 100%)  --------------------------------------------------------------------------------------    EXAM    General: NAD, resting in bed comfortably. A&Ox3.   Cardiac: regular rate  Respiratory: Nonlabored respirations, normal cw expansion.  Abdomen: soft, nontender, nondistended.   Extremities: moving extremities  Vascular: groins soft without signs of hematoma or ecchymosis. DP/PT signals b/l.   brayan le warm well perfused     --------------------------------------------------------------------------------------    LABS                          12.9   15.67 )-----------( 242      ( 20 Jan 2023 05:40 )             39.7     01-20    134<L>  |  100  |  17  ----------------------------<  137<H>  3.4<L>   |  21<L>  |  0.79    Ca    8.9      20 Jan 2023 05:40  Mg     1.70     01-20        --------------------------------------------------------------------------------------

## 2023-01-31 ENCOUNTER — APPOINTMENT (OUTPATIENT)
Dept: VASCULAR SURGERY | Facility: CLINIC | Age: 63
End: 2023-01-31
Payer: COMMERCIAL

## 2023-01-31 VITALS
HEART RATE: 90 BPM | SYSTOLIC BLOOD PRESSURE: 104 MMHG | DIASTOLIC BLOOD PRESSURE: 71 MMHG | WEIGHT: 175 LBS | TEMPERATURE: 97.6 F | BODY MASS INDEX: 29.16 KG/M2 | HEIGHT: 65 IN

## 2023-01-31 DIAGNOSIS — M86.9 OSTEOMYELITIS, UNSPECIFIED: ICD-10-CM

## 2023-01-31 DIAGNOSIS — L98.499 STRICTURE OF ARTERY: ICD-10-CM

## 2023-01-31 DIAGNOSIS — I77.1 STRICTURE OF ARTERY: ICD-10-CM

## 2023-01-31 PROCEDURE — 99214 OFFICE O/P EST MOD 30 MIN: CPT

## 2023-01-31 PROCEDURE — 93923 UPR/LXTR ART STDY 3+ LVLS: CPT

## 2023-01-31 NOTE — ASSESSMENT
[Arterial/Venous Disease] : arterial/venous disease [Medication Management] : medication management [Foot care/Footwear] : foot care/footwear [FreeTextEntry1] : Impression  arterial insuff  w rt toe 2 ulcer  and AIOD s/o endo iliac brayan intervention \par \par [employer] management exercise program , protective measures, exercise program \par continue pletal 50 bid \par d/w pt that i will continue to monitor  brayan UE BP  and even though there is a difference in BP since she is asymptomatic will monitor if there are any sx or US findings then will need  CTA of throax  s/o SA stenosis\par \par rto for  brayan UE art duplex s/o ax a and SA stenosis  next avail then telehealth\par d/w pt sig improvement in  foot and toe pressures and that she is cleared for rt toe  amp by Dr BRETT De La Paz DPM \par If  there  are any wound healing issues   we can then proceed w rt fem pop byp \par if agrees w plan \par ov w reta/pvr s/o art insuff and  AID  s/o brayan iliac artery stent stenosis  12mo sept 2023 \par next  avail for AID to eval  brayan iliac artery stent patency then telehealth \par \par \par \par letter faxed to Dr ИВАН De La Paz DPM

## 2023-01-31 NOTE — PHYSICAL EXAM
[Alert] : alert [Oriented to Person] : oriented to person [Oriented to Place] : oriented to place [Oriented to Time] : oriented to time [Calm] : calm [Right Carotid Bruit] : no bruit heard over the right carotid [Left Carotid Bruit] : no bruit heard over the left carotid [0] : right 0 [1+] : left 1+ [de-identified] : nad [de-identified] : wnl [de-identified] : no resp distress [FreeTextEntry1] : Mild arterial insufficiency w mild  trophic skin changes \par rt toe 2 tip  wound w eschar 1mm \par no drainage \par brayan feet w good capillary refill and perfusion  [de-identified] : wnl [de-identified] : Jonny Cranial nerves 2-12 jonny grossly intact [de-identified] : cooperative

## 2023-01-31 NOTE — HISTORY OF PRESENT ILLNESS
[FreeTextEntry1] : pt c/o brayan le intermittent claudication at several  blocks unchanged from last ov, pt is on pletal  pt was advised to be eval for s/o subclavian artery stenosis  due to brayan ue  bp difference  pt is asymptomatic from ue standpoint  no sx of subclavian steal   [de-identified] : pt is on pletal 50 bid \par pt is s/p   brayan iliac artery endo intervention\par pt states  both legs are feeling better\par pt states that she is willing to proceed w rt toe 2  podiatric intervention and  or rt fem pop byp if indicated

## 2023-01-31 NOTE — DATA REVIEWED
[FreeTextEntry1] : 7/5/2016 carotid Duplex and RETA/PVR to be mono by pt \par \par 7/8/2016 Carotid Duplex Rt ICA greater 80% stenosis Lt ICA 50% stenosis  Brayan ant [facility] flow\par \par 7/8/2016 RETA/PVR mild to mod RLE inflow dz and LLE mild to mod infraing art insuff  Rt reta .53 lt reta .66\par \par 9/21/2016 RLE Venous Duplex no dvt svt\par \par 7/26/2017  Carotid duplex Rt CEA restenosis 40-50% restenosis Lt ICA  less 50% stenosis Brayan ant [facility] flow\par \par 7/26/2017 RETA/PVR  brayan le moderate infragenic art occ dz rt reta .47 lt reta .58\par \par 10/11/2019 RETA/PVR  brayan le moderate infragenic art occ dz rt reta .48 lt reta .61\par \par 10/11/2019 Brayan UE Arterial Duplex  \par                                  Rt sig for  SA stenosis  less 50% stenosis (199cm/min) , \par                                                               Ax A  >75% stenosis (410 cm/min)\par                                  LUE sig for  Subclavian Art stenosis >50% stenosis (360 cm/min)\par \par 6/24/2020 RETA/PVR  Brayan le moderate infragenic art occ dz rt reta .5 lt reta .68\par \par 6/24/2020  Brayan UE Arterial Duplex  \par                                  Rt sig for  SA stenosis  less 60-70% stenosis (338cm/min) , \par                                                               Ax A  50% stenosis (207 cm/min)\par                                  LUE sig for  Subclavian Art stenosis >50% stenosis (334 cm/min)\par                                                               Ax A  50% stenosis (241 cm/min)\par \par 8/16/2021 Carotid Duplex Rt CEA restenosis 40-50% restenosis (148/42) \par                                          Lt ICA   50% stenosis(197/40)\par                                          Brayan ant [facility] flow\par \par \par 8/16/2021 RETA/PVR  Brayan LE  moderate infragenic art occ dz rt reta .46 lt reta .55\par \par 8/17/2021 Brayan UE Arterial Duplex  \par                                  Rt sig for  SA stenosis about 70% stenosis (315cm/min) , \par                                                               Ax A  50% stenosis (109 cm/min)\par                                                               occluded rt radial artery\par                                  LUE sig for  Subclavian Art stenosis >70% stenosis (416 cm/min)\par                                                               Ax A  50% stenosis (256 cm/min)\par \par \par 5/5/2022  Outside facility study report reviewed AID  AA 2.2 cm normal\par \par \par 5/5/2022  Outside facility study report reviewed \par                                Carotid Duplex  \par                                 Rt CEA  less 50% stenosis  by velocity criteria\par                                  Lt  ICA  less 50% stenosis  by velocity criteria\par                                  Brayan Ant Vertebral Arterial Flow \par \par 9/12/2022 RETA/PVR  Brayan LE  moderate infragenic art occ dz rt reta .39 lt reta .56\par \par 1/31/2023  RETA/PVR  Brayan LE  moderate infragenic art occ dz rt reta .64 lt reta .65\par \par

## 2023-02-14 ENCOUNTER — OUTPATIENT (OUTPATIENT)
Dept: OUTPATIENT SERVICES | Facility: HOSPITAL | Age: 63
LOS: 1 days | End: 2023-02-14
Payer: COMMERCIAL

## 2023-02-14 VITALS
HEART RATE: 85 BPM | TEMPERATURE: 97 F | RESPIRATION RATE: 16 BRPM | WEIGHT: 175.93 LBS | DIASTOLIC BLOOD PRESSURE: 65 MMHG | SYSTOLIC BLOOD PRESSURE: 126 MMHG | HEIGHT: 65 IN | OXYGEN SATURATION: 99 %

## 2023-02-14 DIAGNOSIS — E11.9 TYPE 2 DIABETES MELLITUS WITHOUT COMPLICATIONS: ICD-10-CM

## 2023-02-14 DIAGNOSIS — Z98.89 OTHER SPECIFIED POSTPROCEDURAL STATES: Chronic | ICD-10-CM

## 2023-02-14 DIAGNOSIS — Z98.890 OTHER SPECIFIED POSTPROCEDURAL STATES: Chronic | ICD-10-CM

## 2023-02-14 DIAGNOSIS — I10 ESSENTIAL (PRIMARY) HYPERTENSION: ICD-10-CM

## 2023-02-14 DIAGNOSIS — I73.9 PERIPHERAL VASCULAR DISEASE, UNSPECIFIED: ICD-10-CM

## 2023-02-14 DIAGNOSIS — L89.892 PRESSURE ULCER OF OTHER SITE, STAGE 2: ICD-10-CM

## 2023-02-14 DIAGNOSIS — Z95.828 PRESENCE OF OTHER VASCULAR IMPLANTS AND GRAFTS: Chronic | ICD-10-CM

## 2023-02-14 DIAGNOSIS — K08.89 OTHER SPECIFIED DISORDERS OF TEETH AND SUPPORTING STRUCTURES: ICD-10-CM

## 2023-02-14 PROCEDURE — 93010 ELECTROCARDIOGRAM REPORT: CPT

## 2023-02-14 RX ORDER — SEMAGLUTIDE 0.68 MG/ML
0 INJECTION, SOLUTION SUBCUTANEOUS
Qty: 0 | Refills: 0 | DISCHARGE

## 2023-02-14 NOTE — H&P PST ADULT - HISTORY OF PRESENT ILLNESS
62 year old female with PMH of HTN, HLD, Type 2 DM, PVD, Arterial insufficiency, s/p B/l iliac stent (1/19/23 on Plavix and Pletal)  presents to Presurgical testing with diagnosis of pressure ulcer of other site stage 2 scheduled for right partal 2nd toe amputation.

## 2023-02-14 NOTE — H&P PST ADULT - NSICDXPASTSURGICALHX_GEN_ALL_CORE_FT
PAST SURGICAL HISTORY:  History of right-sided carotid endarterectomy     S/P D&C (status post dilation and curettage)     S/P insertion of iliac artery stent

## 2023-02-14 NOTE — H&P PST ADULT - PROBLEM SELECTOR PLAN 1
Patient tentatively scheduled for right partal 2nd toe amputation on 2/17/23.  Pre-op instructions provided. Pt given verbal and written instructions with teach back on chlorhexidine wash and pepcid. Pt verbalized understanding with return demonstration.   Preop Covid PCR test ordered .Instructions regarding covid PCR test to be obtained 3- 5 days prior to surgery and locations for covid testing site provided. Pt verbalized understanding.  STACEY precautions,

## 2023-02-14 NOTE — H&P PST ADULT - OTHER CARE PROVIDERS
Dr. Luis Walker (cardiologist ) [phone]                                                    Dr Eugene Harrison (vascular) [phone]

## 2023-02-14 NOTE — H&P PST ADULT - PROBLEM SELECTOR PLAN 5
Patient with one loose upper tooth. Instructed Patient to obtain Dental Evaluation. Patient verbalized understanding.     Discussed with Surgeon via Phone regarding the loose tooth and the need to obtain Dental Evaluation.

## 2023-02-14 NOTE — H&P PST ADULT - PROBLEM SELECTOR PLAN 4
Patient instructed to take Amlodipine, Losartan with a sip of water on the morning of procedure. Patient verbalized understanding. Patient instructed to take Amlodipine, Losartan with a sip of water on the morning of procedure. Patient verbalized understanding.  Patient obtained  medical evaluation  Copy in chart.

## 2023-02-14 NOTE — H&P PST ADULT - PROBLEM SELECTOR PLAN 2
Patient instructed to hold glipizide  the morning of procedure. Pt stated understanding.     Pt on Jardiance, states she was not instructed to hold preoperatively, took a dose today( 2/14/23) . Discussed with surgeon via phone the policy regarding holding Jardiance for three days prior to surgery. Pt instructed to hold Jardiance from tomorrow (2/15/23) which will be only two days prior to surgery.

## 2023-02-14 NOTE — H&P PST ADULT - PRIMARY CARE PROVIDER
Dr Angie Benoit (PCP) [phone]                                                                     Dr Issac Adams (dental ) [phone]

## 2023-02-14 NOTE — H&P PST ADULT - PROBLEM SELECTOR PLAN 3
Patient with recent b/l Iliac Stent placement.  Patient on Plavix and Cilostazol. OK as per surgeon to continue prior to surgery. Patient with recent b/l Iliac Stent placement.  Patient on Plavix and Cilostazol. OK as per surgeon to continue prior to surgery.  Last Vascular note in chart

## 2023-02-14 NOTE — H&P PST ADULT - MUSCULOSKELETAL COMMENTS
mild arthritis in both knees, Wound to right 2nd toe dressing intact  preop dx: pressure ulcer of other site stage 2 wound to right 2nd toe preop dx: pressure ulcer of other site stage 2

## 2023-02-20 ENCOUNTER — TRANSCRIPTION ENCOUNTER (OUTPATIENT)
Age: 63
End: 2023-02-20

## 2023-02-21 ENCOUNTER — TRANSCRIPTION ENCOUNTER (OUTPATIENT)
Age: 63
End: 2023-02-21

## 2023-02-21 ENCOUNTER — OUTPATIENT (OUTPATIENT)
Dept: OUTPATIENT SERVICES | Facility: HOSPITAL | Age: 63
LOS: 1 days | Discharge: HOME | End: 2023-02-21
Payer: COMMERCIAL

## 2023-02-21 ENCOUNTER — RESULT REVIEW (OUTPATIENT)
Age: 63
End: 2023-02-21

## 2023-02-21 VITALS
WEIGHT: 175.93 LBS | TEMPERATURE: 98 F | RESPIRATION RATE: 16 BRPM | SYSTOLIC BLOOD PRESSURE: 97 MMHG | HEART RATE: 85 BPM | OXYGEN SATURATION: 100 % | DIASTOLIC BLOOD PRESSURE: 66 MMHG | HEIGHT: 65 IN

## 2023-02-21 VITALS
RESPIRATION RATE: 15 BRPM | SYSTOLIC BLOOD PRESSURE: 97 MMHG | OXYGEN SATURATION: 99 % | TEMPERATURE: 98 F | HEART RATE: 85 BPM | DIASTOLIC BLOOD PRESSURE: 53 MMHG

## 2023-02-21 DIAGNOSIS — Z98.890 OTHER SPECIFIED POSTPROCEDURAL STATES: Chronic | ICD-10-CM

## 2023-02-21 DIAGNOSIS — Z95.828 PRESENCE OF OTHER VASCULAR IMPLANTS AND GRAFTS: Chronic | ICD-10-CM

## 2023-02-21 DIAGNOSIS — Z98.89 OTHER SPECIFIED POSTPROCEDURAL STATES: Chronic | ICD-10-CM

## 2023-02-21 DIAGNOSIS — L89.892 PRESSURE ULCER OF OTHER SITE, STAGE 2: ICD-10-CM

## 2023-02-21 LAB — GLUCOSE BLDC GLUCOMTR-MCNC: 198 MG/DL — HIGH (ref 70–99)

## 2023-02-21 PROCEDURE — 73630 X-RAY EXAM OF FOOT: CPT | Mod: 26,RT

## 2023-02-21 PROCEDURE — 88305 TISSUE EXAM BY PATHOLOGIST: CPT | Mod: 26

## 2023-02-21 PROCEDURE — 88311 DECALCIFY TISSUE: CPT | Mod: 26

## 2023-02-21 RX ORDER — CILOSTAZOL 100 MG/1
1 TABLET ORAL
Qty: 0 | Refills: 0 | DISCHARGE

## 2023-02-21 RX ORDER — ATORVASTATIN CALCIUM 80 MG/1
1 TABLET, FILM COATED ORAL
Qty: 0 | Refills: 0 | DISCHARGE

## 2023-02-21 RX ORDER — MONTELUKAST 4 MG/1
1 TABLET, CHEWABLE ORAL
Qty: 0 | Refills: 0 | DISCHARGE

## 2023-02-21 RX ORDER — HYDROCHLOROTHIAZIDE 25 MG
1 TABLET ORAL
Qty: 0 | Refills: 0 | DISCHARGE

## 2023-02-21 RX ORDER — AMLODIPINE BESYLATE 2.5 MG/1
1 TABLET ORAL
Qty: 0 | Refills: 0 | DISCHARGE

## 2023-02-21 RX ORDER — ASPIRIN/CALCIUM CARB/MAGNESIUM 324 MG
1 TABLET ORAL
Qty: 0 | Refills: 0 | DISCHARGE

## 2023-02-21 RX ORDER — LOSARTAN POTASSIUM 100 MG/1
1 TABLET, FILM COATED ORAL
Qty: 0 | Refills: 0 | DISCHARGE

## 2023-02-21 RX ORDER — SEMAGLUTIDE 0.68 MG/ML
1 INJECTION, SOLUTION SUBCUTANEOUS
Qty: 0 | Refills: 0 | DISCHARGE

## 2023-02-21 RX ORDER — METOPROLOL TARTRATE 50 MG
1 TABLET ORAL
Qty: 0 | Refills: 0 | DISCHARGE

## 2023-02-21 RX ORDER — EMPAGLIFLOZIN 10 MG/1
1 TABLET, FILM COATED ORAL
Qty: 0 | Refills: 0 | DISCHARGE

## 2023-02-21 NOTE — ASU DISCHARGE PLAN (ADULT/PEDIATRIC) - NS MD DC FALL RISK RISK
For information on Fall & Injury Prevention, visit: [email or web address] OR  [email or web address] OR  https://www.cdc.gov/steadi/patient.html

## 2023-02-21 NOTE — ASU DISCHARGE PLAN (ADULT/PEDIATRIC) - CARE PROVIDER_API CALL
Elizabeth De La Paz (DPM)  Podiatric Medicine and Surgery  [address] [place] [address]  Phone: [phone]  Fax: [phone]  Follow Up Time:    Elizabeth De La Paz (DPM)  Podiatric Medicine and Surgery  [address] [place] [address]  Phone: [phone]  Fax: [phone]  Follow Up Time: 1 week

## 2023-02-21 NOTE — ASU PREOPERATIVE ASSESSMENT, ADULT (IPARK ONLY) - FALL HARM RISK - HARM RISK INTERVENTIONS

## 2023-02-26 LAB
CULTURE RESULTS: SIGNIFICANT CHANGE UP
SPECIMEN SOURCE: SIGNIFICANT CHANGE UP

## 2023-03-01 LAB — SURGICAL PATHOLOGY STUDY: SIGNIFICANT CHANGE UP

## 2023-03-21 ENCOUNTER — APPOINTMENT (OUTPATIENT)
Dept: VASCULAR SURGERY | Facility: CLINIC | Age: 63
End: 2023-03-21
Payer: COMMERCIAL

## 2023-03-21 VITALS — HEART RATE: 80 BPM | SYSTOLIC BLOOD PRESSURE: 127 MMHG | DIASTOLIC BLOOD PRESSURE: 81 MMHG

## 2023-03-21 VITALS — WEIGHT: 175 LBS | BODY MASS INDEX: 29.16 KG/M2 | HEIGHT: 65 IN | TEMPERATURE: 98 F

## 2023-03-21 PROCEDURE — 93930 UPPER EXTREMITY STUDY: CPT

## 2023-03-21 PROCEDURE — 99214 OFFICE O/P EST MOD 30 MIN: CPT

## 2023-03-21 PROCEDURE — 93978 VASCULAR STUDY: CPT

## 2023-03-21 RX ORDER — CILOSTAZOL 50 MG/1
50 TABLET ORAL
Qty: 180 | Refills: 3 | Status: COMPLETED | COMMUNITY
Start: 2020-06-24 | End: 2023-03-21

## 2023-03-21 RX ORDER — LOSARTAN POTASSIUM AND HYDROCHLOROTHIAZIDE 12.5; 1 MG/1; MG/1
TABLET ORAL
Refills: 0 | Status: COMPLETED | COMMUNITY
End: 2023-03-21

## 2023-03-21 RX ORDER — NEBIVOLOL HYDROCHLORIDE 5 MG/1
5 TABLET ORAL DAILY
Qty: 30 | Refills: 0 | Status: COMPLETED | COMMUNITY
Start: 2017-07-26 | End: 2023-03-21

## 2023-03-21 NOTE — ASSESSMENT
[Arterial/Venous Disease] : arterial/venous disease [Medication Management] : medication management [Foot care/Footwear] : foot care/footwear [FreeTextEntry1] : Impression  arterial insuff  s/p rt toe 2  partial amp doing well,  and AIOD s/o endo iliac brayan intervention  doing well w dec in sx\par \par \par \par [employer] management exercise program , protective measures, exercise program \par continue pletal 50 bid \par continue plavix 75 daily \par d/w pt that i will continue to monitor  brayan UE BP  and even though there is a difference in BP since she is asymptomatic will monitor if there are any sx or US findings then will need  CTA of throax  s/o SA stenosis\par ov w  brayan UE art duplex s/o ax a and SA stenosis  12 mo  march 2024 \par ov w reta/pvr s/o art insuff and  AID  s/o brayan iliac artery stent stenosis  12mo sept 2024\par ov w  AID to eval  brayan iliac artery stent patency 6 mo  sept 2023 \par \par \par \par letter faxed to Dr ИВАН De La Paz DPM

## 2023-03-21 NOTE — PHYSICAL EXAM
[0] : right 0 [1+] : left 1+ [Alert] : alert [Oriented to Person] : oriented to person [Oriented to Place] : oriented to place [Oriented to Time] : oriented to time [Calm] : calm [Right Carotid Bruit] : no bruit heard over the right carotid [Left Carotid Bruit] : no bruit heard over the left carotid [de-identified] : nad [de-identified] : wnl [de-identified] : no resp distress [FreeTextEntry1] : Mild arterial insufficiency w mild  trophic skin changes \par rt toe 2 amp site healing well \par no drainage \par brayan feet w good capillary refill and perfusion  [de-identified] : wnl [de-identified] : Jonny Cranial nerves 2-12 jonny grossly intact [de-identified] : cooperative

## 2023-03-21 NOTE — HISTORY OF PRESENT ILLNESS
[FreeTextEntry1] : pt c/o brayan le intermittent claudication at several  blocks unchanged from last ov, pt is on pletal  pt was advised to be eval for s/o subclavian artery stenosis  due to brayan ue  bp difference  pt is asymptomatic from ue standpoint  no sx of subclavian steal   [de-identified] : pt is on pletal 50 bid \par pt is s/p   brayan iliac artery endo intervention\par pt states  both legs are feeling better and is able to ambulate better w less thigh discomfort brayan\par pt is s/p rt toe 2 tip amp  by Dr BRETT De La Paz DPM

## 2023-03-21 NOTE — DATA REVIEWED
[FreeTextEntry1] : 7/5/2016 carotid Duplex and RETA/PVR to be mono by pt \par \par 7/8/2016 Carotid Duplex Rt ICA greater 80% stenosis Lt ICA 50% stenosis  [facility] ant [facility] flow\par \par 7/8/2016 RETA/PVR mild to mod RLE inflow dz and LLE mild to mod infraing art insuff  Rt reta .53 lt reta .66\par \par 9/21/2016 RLE Venous Duplex no dvt svt\par \par 7/26/2017  Carotid duplex Rt CEA restenosis 40-50% restenosis Lt ICA  less 50% stenosis B[facility]an ant [facility] flow\par \par 7/26/2017 RETA/PVR  brayan le moderate infragenic art occ dz rt reta .47 lt reta .58\par \par 10/11/2019 RETA/PVR  brayan le moderate infragenic art occ dz rt reta .48 lt reta .61\par \par 10/11/2019 Brayan UE Arterial Duplex  \par                                  Rt sig for  SA stenosis  less 50% stenosis (199cm/min) , \par                                                               Ax A  >75% stenosis (410 cm/min)\par                                  LUE sig for  Subclavian Art stenosis >50% stenosis (360 cm/min)\par \par 6/24/2020 RETA/PVR  Brayan le moderate infragenic art occ dz rt reta .5 lt reta .68\par \par 6/24/2020  Brayan UE Arterial Duplex  \par                                  Rt sig for  SA stenosis  less 60-70% stenosis (338cm/min) , \par                                                               Ax A  50% stenosis (207 cm/min)\par                                  LUE sig for  Subclavian Art stenosis >50% stenosis (334 cm/min)\par                                                               Ax A  50% stenosis (241 cm/min)\par \par 8/16/2021 Carotid Duplex Rt CEA restenosis 40-50% restenosis (148/42) \par                                          Lt ICA   50% stenosis(197/40)\par                                          B[facility]an ant [facility] flow\par \par \par 8/16/2021 RETA/PVR  Brayan LE  moderate infragenic art occ dz rt reta .46 lt reta .55\par \par 8/17/2021 Brayan UE Arterial Duplex  \par                                  Rt sig for  SA stenosis about 70% stenosis (315cm/min) , \par                                                               Ax A  50% stenosis (109 cm/min)\par                                                               occluded rt radial artery\par                                  LUE sig for  Subclavian Art stenosis >70% stenosis (416 cm/min)\par                                                               Ax A  50% stenosis (256 cm/min)\par \par \par 5/5/2022  Outside facility study report reviewed AID  AA 2.2 cm normal\par \par \par 5/5/2022  Outside facility study report reviewed \par                                Carotid Duplex  \par                                 Rt CEA  less 50% stenosis  by velocity criteria\par                                  Lt  ICA  less 50% stenosis  by velocity criteria\par                                  Brayan Ant Vertebral Arterial Flow \par \par 9/12/2022 RETA/PVR  Brayan LE  moderate infragenic art occ dz rt reta .39 lt reta .56\par \par 1/31/2023  RETA/PVR  Brayan LE  moderate infragenic art occ dz rt reta .64 lt reta .65\par \par 3/21/2023 Brayan UE Arterial Duplex  \par                                  Rt   SA no sig  stenosis \par                                  LUE sig for  Subclavian Art stenosis >70% stenosis (422 cm/min)\par                                                               Ax A  50% stenosis (245cm/min)\par \par \par 2/21/2023 Aorto Iliac Duplex  AA patent,  patent brayan  iliac arterial systems\par                                          brayan iliac art stents not viz\par                                           Rt NIYA 398cm  Lt NIYA  295 cm \par

## 2023-04-08 NOTE — ASSESSMENT
[FreeTextEntry1] : Spent 30 minutes initial consultation with patient.\par Discussed etiology of right second digit osteomyelitis in association with peripheral vascular disease.\par Reviewed vascular surgeon note.\par Discussed with patient amputation of right second digit in area of Osteomyelitis.\par At this time patient is to continue with supportive local wound care Betadine and DSD daily.  Patient educated signs of progressive infection.  And if occur is to report to the [facility] emergency room and call office immediately and ask for podiatry resident.  Patient is to follow-up with vascular surgeon.\par Patient follow-up in 2 weeks for assessment evaluation or sooner if problem arise

## 2023-05-09 ENCOUNTER — APPOINTMENT (OUTPATIENT)
Dept: VASCULAR SURGERY | Facility: CLINIC | Age: 63
End: 2023-05-09
Payer: MEDICARE

## 2023-05-09 VITALS
HEART RATE: 76 BPM | HEIGHT: 65 IN | BODY MASS INDEX: 29.82 KG/M2 | DIASTOLIC BLOOD PRESSURE: 72 MMHG | SYSTOLIC BLOOD PRESSURE: 107 MMHG | TEMPERATURE: 98.4 F | WEIGHT: 179 LBS

## 2023-05-09 DIAGNOSIS — I77.1 STRICTURE OF ARTERY: ICD-10-CM

## 2023-05-09 PROCEDURE — 99214 OFFICE O/P EST MOD 30 MIN: CPT

## 2023-05-09 PROCEDURE — 93930 UPPER EXTREMITY STUDY: CPT

## 2023-05-09 PROCEDURE — 93880 EXTRACRANIAL BILAT STUDY: CPT

## 2023-05-09 RX ORDER — EMPAGLIFLOZIN 25 MG/1
TABLET, FILM COATED ORAL
Refills: 0 | Status: DISCONTINUED | COMMUNITY
End: 2023-05-09

## 2023-05-09 RX ORDER — CILOSTAZOL 50 MG/1
50 TABLET ORAL
Qty: 180 | Refills: 3 | Status: DISCONTINUED | COMMUNITY
Start: 2023-01-31 | End: 2023-05-09

## 2023-05-09 NOTE — ASSESSMENT
[Arterial/Venous Disease] : arterial/venous disease [Medication Management] : medication management [Foot care/Footwear] : foot care/footwear [FreeTextEntry1] : Impression  arterial insuff  and AIOD s/o endo iliac brayan intervention  doing well and stable carotid and SA stenosis \par \par \par \par [employer] management exercise program , protective measures, exercise program \par continue pletal 50 bid \par continue plavix 75 daily \par d/w pt that i will continue to monitor  brayan UE BP  and even though there is a difference in BP since she is asymptomatic will monitor if there are any sx or US findings then will need  CTA of throax  s/o SA stenosis\par ov w  AID to eval  brayan iliac artery stent patency 6 mo  sept 2023 \par ov w  brayan UE art duplex s/o ax a and SA stenosis  12 mo   may 2024 \par ov w reta/pvr s/o art insuff and  AID  s/o brayan iliac artery stent stenosis  12mo sept 2024\par \par \par letter faxed to Dr ИВАН De La Paz DPM

## 2023-05-09 NOTE — DATA REVIEWED
[FreeTextEntry1] : 7/5/2016 carotid Duplex and RETA/PVR to be mono by pt \par \par 7/8/2016 Carotid Duplex Rt ICA greater 80% stenosis Lt ICA 50% stenosis  B[facility]an ant [facility] flow\par \par 7/8/2016 RETA/PVR mild to mod RLE inflow dz and LLE mild to mod infraing art insuff  Rt reta .53 lt reta .66\par \par 9/21/2016 RLE Venous Duplex no dvt svt\par \par 7/26/2017  Carotid duplex Rt CEA restenosis 40-50% restenosis Lt ICA  less 50% stenosis Brayan ant [facility] flow\par \par 7/26/2017 RETA/PVR  brayan le moderate infragenic art occ dz rt reta .47 lt reta .58\par \par 10/11/2019 RETA/PVR  brayan le moderate infragenic art occ dz rt reta .48 lt reta .61\par \par 10/11/2019 Brayan UE Arterial Duplex  \par                                  Rt sig for  SA stenosis  less 50% stenosis (199cm/min) , \par                                                               Ax A  >75% stenosis (410 cm/min)\par                                  LUE sig for  Subclavian Art stenosis >50% stenosis (360 cm/min)\par \par 6/24/2020 RETA/PVR  Brayan le moderate infragenic art occ dz rt reta .5 lt reta .68\par \par 6/24/2020  Brayan UE Arterial Duplex  \par                                  Rt sig for  SA stenosis  less 60-70% stenosis (338cm/min) , \par                                                               Ax A  50% stenosis (207 cm/min)\par                                  LUE sig for  Subclavian Art stenosis >50% stenosis (334 cm/min)\par                                                               Ax A  50% stenosis (241 cm/min)\par \par 8/16/2021 Carotid Duplex Rt CEA restenosis 40-50% restenosis (148/42) \par                                          Lt ICA   50% stenosis(197/40)\par                                          Brayan ant [facility] flow\par \par \par 8/16/2021 RETA/PVR  Brayan LE  moderate infragenic art occ dz rt reta .46 lt reta .55\par \par 8/17/2021 Brayan UE Arterial Duplex  \par                                  Rt sig for  SA stenosis about 70% stenosis (315cm/min) , \par                                                               Ax A  50% stenosis (109 cm/min)\par                                                               occluded rt radial artery\par                                  LUE sig for  Subclavian Art stenosis >70% stenosis (416 cm/min)\par                                                               Ax A  50% stenosis (256 cm/min)\par \par \par 5/5/2022  Outside facility study report reviewed AID  AA 2.2 cm normal\par \par \par 5/5/2022  Outside facility study report reviewed \par                                Carotid Duplex  \par                                 Rt CEA  less 50% stenosis  by velocity criteria\par                                  Lt  ICA  less 50% stenosis  by velocity criteria\par                                  Brayan Ant Vertebral Arterial Flow \par \par 9/12/2022 RETA/PVR  Brayan LE  moderate infragenic art occ dz rt reta .39 lt reta .56\par \par 1/31/2023  RETA/PVR  Brayan LE  moderate infragenic art occ dz rt reta .64 lt reta .65\par \par 3/21/2023 Brayan UE Arterial Duplex  \par                                  Rt   SA no sig  stenosis \par                                  LUE sig for  Subclavian Art stenosis >70% stenosis (422 cm/min)\par                                                               Ax A  50% stenosis (245cm/min)\par \par \par 2/21/2023 Aorto Iliac Duplex  AA patent,  patent brayan  iliac arterial systems\par                                          brayan iliac art stents not viz\par                                           Rt NIYA 398cm  Lt NIYA  295 cm \par \par 5/9/2023 Carotid Duplex  Rt ICA  less 50% stenosis  by velocity criteria (152/47)\par                          Lt  ICA  50%, stenosis  by velocity criteria (206/28)\par                          Brayan Ant Vertebral Arterial Flow\par                           Rt SA stenosis  less 50% (215/35)\par                           Lt SA stenosis  50-70% ( 317/35)\par \par  \par \par

## 2023-05-09 NOTE — HISTORY OF PRESENT ILLNESS
[FreeTextEntry1] : pt c/o brayan le intermittent claudication at several  blocks unchanged from last ov, pt is on pletal  pt was advised to be eval for s/o subclavian artery stenosis  due to brayan ue  bp difference  pt is asymptomatic from ue standpoint  no sx of subclavian steal   [de-identified] : pt is on pletal 50 bid \par pt is s/p   brayan iliac artery endo intervention\par pt states  both legs are feeling better and is able to ambulate better w less thigh discomfort brayan\par pt  states no ue c/o \par

## 2023-05-09 NOTE — PHYSICAL EXAM
[0] : right 0 [Alert] : alert [Oriented to Person] : oriented to person [Oriented to Place] : oriented to place [Oriented to Time] : oriented to time [Calm] : calm [Right Carotid Bruit] : no bruit heard over the right carotid [Left Carotid Bruit] : no bruit heard over the left carotid [2+] : right 2+ [1+] : left 1+ [de-identified] : nad [de-identified] : wnl [de-identified] : no resp distress [FreeTextEntry1] : Mild arterial insufficiency w mild  trophic skin changes \par no wounds \par brayan feet w good capillary refill and perfusion  [de-identified] : wnl [de-identified] : Jonny Cranial nerves 2-12 jonny grossly intact [de-identified] : cooperative

## 2023-08-29 ENCOUNTER — APPOINTMENT (OUTPATIENT)
Dept: VASCULAR SURGERY | Facility: CLINIC | Age: 63
End: 2023-08-29
Payer: MEDICARE

## 2023-08-29 PROCEDURE — 93978 VASCULAR STUDY: CPT

## 2023-09-06 ENCOUNTER — APPOINTMENT (OUTPATIENT)
Dept: VASCULAR SURGERY | Facility: CLINIC | Age: 63
End: 2023-09-06
Payer: COMMERCIAL

## 2023-09-06 DIAGNOSIS — I77.1 STRICTURE OF ARTERY: ICD-10-CM

## 2023-09-06 DIAGNOSIS — I74.09 OTHER ARTERIAL EMBOLISM AND THROMBOSIS OF ABDOMINAL AORTA: ICD-10-CM

## 2023-09-06 DIAGNOSIS — I87.2 VENOUS INSUFFICIENCY (CHRONIC) (PERIPHERAL): ICD-10-CM

## 2023-09-06 DIAGNOSIS — I70.208 UNSPECIFIED ATHEROSCLEROSIS OF NATIVE ARTERIES OF EXTREMITIES, OTHER EXTREMITY: ICD-10-CM

## 2023-09-06 DIAGNOSIS — I65.23 OCCLUSION AND STENOSIS OF BILATERAL CAROTID ARTERIES: ICD-10-CM

## 2023-09-06 PROCEDURE — 99441: CPT

## 2023-09-06 NOTE — PHYSICAL EXAM
[Alert] : alert [Oriented to Person] : oriented to person [Oriented to Place] : oriented to place [Oriented to Time] : oriented to time [Calm] : calm [de-identified] : cooperative [FreeTextEntry1] : Physical exam findings via telephonic review with patient

## 2023-09-06 NOTE — ASSESSMENT
[FreeTextEntry1] : Impression  arterial insuff  and AIOD s/o endo iliac brayan intervention  doing well and stable carotid and SA stenosis  clinically stable also     [employer] management exercise program , protective measures, exercise program  continue pletal 50 bid  continue plavix 75 daily  d/w pt that i will continue to monitor  brayan UE BP  and even though there is a difference in BP since she is asymptomatic will monitor if there are any sx or US findings then will need  CTA of throax  s/o SA stenosis ov w  brayan UE art duplex s/o ax a and SA stenosis  12 mo   may 2024  ov w reta/pvr s/o art insuff and  AID  s/o brayan iliac artery stent stenosis  12mo sept 2024 Telephonic visit  time duration   7min    letter faxed to Dr ИВАН De La Paz DPM    [Arterial/Venous Disease] : arterial/venous disease [Medication Management] : medication management [Foot care/Footwear] : foot care/footwear

## 2023-09-06 NOTE — REASON FOR VISIT
[Home] : at home, [unfilled] , at the time of the visit. [Medical Office: ([facility])___] : at the medical office located in  [Other:____] : [unfilled] [Verbal consent obtained from patient] : the patient, [unfilled] [Follow-Up: _____] : a [unfilled] follow-up visit [FreeTextEntry1] : i have a carotid blockage

## 2023-09-06 NOTE — DATA REVIEWED
[FreeTextEntry1] : 7/5/2016 carotid Duplex and RETA/PVR to be mono by pt   7/8/2016 Carotid Duplex Rt ICA greater 80% stenosis Lt ICA 50% stenosis  Brayan ant VA flow  7/8/2016 RETA/PVR mild to mod RLE inflow dz and LLE mild to mod infraing art insuff  Rt reta .53 lt reta .66  9/21/2016 RLE Venous Duplex no dvt svt  7/26/2017  Carotid duplex Rt CEA restenosis 40-50% restenosis Lt ICA  less 50% stenosis Brayan ant VA flow  7/26/2017 RETA/PVR  brayan le moderate infragenic art occ dz rt reta .47 lt reta .58  10/11/2019 RETA/PVR  brayan le moderate infragenic art occ dz rt reta .48 lt reta .61  10/11/2019 Brayan UE Arterial Duplex                                    Rt sig for  SA stenosis  less 50% stenosis (199cm/min) ,                                                                Ax A  >75% stenosis (410 cm/min)                                  LUE sig for  Subclavian Art stenosis >50% stenosis (360 cm/min)  6/24/2020 RETA/PVR  Brayan le moderate infragenic art occ dz rt reta .5 lt reta .68  6/24/2020  Brayan UE Arterial Duplex                                    Rt sig for  SA stenosis  less 60-70% stenosis (338cm/min) ,                                                                Ax A  50% stenosis (207 cm/min)                                  LUE sig for  Subclavian Art stenosis >50% stenosis (334 cm/min)                                                               Ax A  50% stenosis (241 cm/min)  8/16/2021 Carotid Duplex Rt CEA restenosis 40-50% restenosis (148/42)                                           Lt ICA   50% stenosis(197/40)                                          Brayan ant [facility] flow   8/16/2021 RETA/PVR  Brayan LE  moderate infragenic art occ dz rt reta .46 lt reta .55  8/17/2021 Brayan UE Arterial Duplex                                    Rt sig for  SA stenosis about 70% stenosis (315cm/min) ,                                                                Ax A  50% stenosis (109 cm/min)                                                               occluded rt radial artery                                  LUE sig for  Subclavian Art stenosis >70% stenosis (416 cm/min)                                                               Ax A  50% stenosis (256 cm/min)   5/5/2022  Outside facility study report reviewed AID  AA 2.2 cm normal   5/5/2022  Outside facility study report reviewed                                 Carotid Duplex                                   Rt CEA  less 50% stenosis  by velocity criteria                                  Lt  ICA  less 50% stenosis  by velocity criteria                                  Brayan Ant Vertebral Arterial Flow   9/12/2022 RETA/PVR  Brayan LE  moderate infragenic art occ dz rt reta .39 lt reta .56  1/31/2023  RETA/PVR  Brayan LE  moderate infragenic art occ dz rt reta .64 lt reta .65  3/21/2023 Brayan UE Arterial Duplex                                    Rt   SA no sig  stenosis                                   LUE sig for  Subclavian Art stenosis >70% stenosis (422 cm/min)                                                               Ax A  50% stenosis (245cm/min)   2/21/2023 Aorto Iliac Duplex  AA patent,  patent brayan  iliac arterial systems                                          brayan iliac art stents not viz                                           Rt NIYA 398cm  Lt NIYA  295 cm   5/9/2023 Carotid Duplex  Rt ICA  less 50% stenosis  by velocity criteria (152/47)                          Lt  ICA  50%, stenosis  by velocity criteria (206/28)                          Brayan Ant Vertebral Arterial Flow                           Rt SA stenosis  less 50% (215/35)                           Lt SA stenosis  50-70% ( 317/35)    8/29/2023 Aorto Iliac Duplex  AA patent,  patent brayan  iliac arterial systems                                          brayan iliac art stents patent w increased velocities                                            Rt NIYA 292cm  Lt NIYA  241 cm

## 2023-09-06 NOTE — HISTORY OF PRESENT ILLNESS
[FreeTextEntry1] : pt c/o brayan le intermittent claudication at several  blocks unchanged from last ov, pt is on pletal  pt was advised to be eval for s/o subclavian artery stenosis  due to brayan ue  bp difference  pt is asymptomatic from ue standpoint  no sx of subclavian steal   [de-identified] : pt is on pletal 50 bid  pt is s/p   brayan iliac artery endo intervention pt states  both legs are feeling better and is able to ambulate  pt states no le c/o  pt  states no  new ue c/o

## 2023-12-06 RX ORDER — CILOSTAZOL 50 MG/1
50 TABLET ORAL
Qty: 60 | Refills: 3 | Status: ACTIVE | COMMUNITY
Start: 2023-12-06 | End: 1900-01-01

## 2023-12-06 RX ORDER — CILOSTAZOL 50 MG/1
50 TABLET ORAL
Qty: 180 | Refills: 3 | Status: ACTIVE | COMMUNITY
Start: 2022-09-21 | End: 1900-01-01

## 2023-12-07 RX ORDER — CLOPIDOGREL BISULFATE 75 MG/1
75 TABLET, FILM COATED ORAL
Qty: 90 | Refills: 3 | Status: ACTIVE | COMMUNITY
Start: 2023-01-31 | End: 1900-01-01

## 2023-12-08 ENCOUNTER — NON-APPOINTMENT (OUTPATIENT)
Age: 63
End: 2023-12-08

## 2024-04-22 PROBLEM — D36.9 PAPILLOMA: Status: ACTIVE | Noted: 2024-04-22

## 2024-04-23 ENCOUNTER — APPOINTMENT (OUTPATIENT)
Dept: SURGICAL ONCOLOGY | Facility: CLINIC | Age: 64
End: 2024-04-23
Payer: MEDICARE

## 2024-04-23 VITALS
RESPIRATION RATE: 18 BRPM | SYSTOLIC BLOOD PRESSURE: 118 MMHG | OXYGEN SATURATION: 99 % | WEIGHT: 182 LBS | DIASTOLIC BLOOD PRESSURE: 84 MMHG | HEART RATE: 91 BPM | HEIGHT: 65 IN | BODY MASS INDEX: 30.32 KG/M2

## 2024-04-23 DIAGNOSIS — D36.9 BENIGN NEOPLASM, UNSPECIFIED SITE: ICD-10-CM

## 2024-04-23 PROCEDURE — 99203 OFFICE O/P NEW LOW 30 MIN: CPT

## 2024-04-23 NOTE — CONSULT LETTER
[Please see my note below.] : Please see my note below. [Consult Closing:] : Thank you very much for allowing me to participate in the care of this patient.  If you have any questions, please do not hesitate to contact me. [Sincerely,] : Sincerely, [FreeTextEntry2] :  Dr. Valdo Huggins  [FreeTextEntry3] : Demetri Hill M.D., FFRANKLIN. Associate Professor of Surgery [place] [employer] at [facility]

## 2024-04-23 NOTE — PHYSICAL EXAM
[Normal Supraclavicular Lymph Nodes] : normal supraclavicular lymph nodes [Normal Axillary Lymph Nodes] : normal axillary lymph nodes [Normal] : oriented to person, place and time, with appropriate affect [FreeTextEntry1] : SC present for exam  [de-identified] : Normal S1,S2. Regular rate and rhythm  [de-identified] : Complete breast exam performed in supine and upright position. No palpable masses, tenderness, nipple discharge, inversion, deviation or enlarged axillary or supraclavicular lymph nodes bilaterally. [de-identified] : Clear breath sounds bilaterally with normal respiratory effort.

## 2024-04-23 NOTE — ASSESSMENT
[FreeTextEntry1] : IMP: Delmy is a 64 y.o f with biopsy proven left breast papilloma.  Based on current guidelines, the patient is given the option of short-term follow up with breast US or removal of papilloma.  PLAN: Repeat left breast US in Sept 2024 Return in one year  All medical entries were at my, Dr. Demetri Hill, direction. I have reviewed the chart and agree that the record accurately reflects my personal performance of the history, physical exam, assessment and plan. Our office Nurse Practitioner was present of the duration of the office visit.

## 2024-04-23 NOTE — HISTORY OF PRESENT ILLNESS
[de-identified] : Ms. Delmy Mitchell is a 64 y.o female who presents for an initial consultation for papilloma, referred by Dr. Valdo Huggins   MMG/US on 2/17/2024 showed a dilated duct containing a suspected intraductal mass in the left retroareolar region. BI-RADS 0  B/L breast US on 2/26/2024 revealed a suspicious left breast 6 mm intraductal mass within a dilated duct in the retroareolar aspect for which USGB recommended. A papillary lesion is suspected. BI-RADS 4  USGB on 3/11/2024 of the left breast shows intraductal papilloma

## 2024-05-14 ENCOUNTER — APPOINTMENT (OUTPATIENT)
Dept: VASCULAR SURGERY | Facility: CLINIC | Age: 64
End: 2024-05-14

## 2024-07-09 ENCOUNTER — APPOINTMENT (OUTPATIENT)
Dept: VASCULAR SURGERY | Facility: CLINIC | Age: 64
End: 2024-07-09
Payer: MEDICARE

## 2024-07-09 PROCEDURE — 93930 UPPER EXTREMITY STUDY: CPT

## 2024-07-09 PROCEDURE — 93880 EXTRACRANIAL BILAT STUDY: CPT

## 2024-10-16 ENCOUNTER — NON-APPOINTMENT (OUTPATIENT)
Age: 64
End: 2024-10-16

## 2024-12-31 ENCOUNTER — APPOINTMENT (OUTPATIENT)
Dept: VASCULAR SURGERY | Facility: CLINIC | Age: 64
End: 2024-12-31
Payer: MEDICARE

## 2024-12-31 VITALS — WEIGHT: 180 LBS | TEMPERATURE: 98.2 F | RESPIRATION RATE: 18 BRPM | BODY MASS INDEX: 29.99 KG/M2 | HEIGHT: 65 IN

## 2024-12-31 VITALS — HEART RATE: 82 BPM | DIASTOLIC BLOOD PRESSURE: 71 MMHG | SYSTOLIC BLOOD PRESSURE: 129 MMHG

## 2024-12-31 DIAGNOSIS — I70.208 UNSPECIFIED ATHEROSCLEROSIS OF NATIVE ARTERIES OF EXTREMITIES, OTHER EXTREMITY: ICD-10-CM

## 2024-12-31 DIAGNOSIS — I77.1 STRICTURE OF ARTERY: ICD-10-CM

## 2024-12-31 DIAGNOSIS — I65.23 OCCLUSION AND STENOSIS OF BILATERAL CAROTID ARTERIES: ICD-10-CM

## 2024-12-31 DIAGNOSIS — I74.09 OTHER ARTERIAL EMBOLISM AND THROMBOSIS OF ABDOMINAL AORTA: ICD-10-CM

## 2024-12-31 PROCEDURE — 93978 VASCULAR STUDY: CPT

## 2024-12-31 PROCEDURE — ZZZZZ: CPT

## 2024-12-31 PROCEDURE — 93923 UPR/LXTR ART STDY 3+ LVLS: CPT

## 2024-12-31 PROCEDURE — 99214 OFFICE O/P EST MOD 30 MIN: CPT

## 2025-02-05 DIAGNOSIS — D36.9 BENIGN NEOPLASM, UNSPECIFIED SITE: ICD-10-CM

## 2025-02-14 ENCOUNTER — NON-APPOINTMENT (OUTPATIENT)
Age: 65
End: 2025-02-14

## 2025-07-29 ENCOUNTER — APPOINTMENT (OUTPATIENT)
Dept: VASCULAR SURGERY | Facility: CLINIC | Age: 65
End: 2025-07-29
Payer: MEDICARE

## 2025-07-29 ENCOUNTER — NON-APPOINTMENT (OUTPATIENT)
Age: 65
End: 2025-07-29

## 2025-07-29 VITALS
HEIGHT: 65 IN | HEART RATE: 76 BPM | DIASTOLIC BLOOD PRESSURE: 83 MMHG | SYSTOLIC BLOOD PRESSURE: 145 MMHG | WEIGHT: 182 LBS | BODY MASS INDEX: 30.32 KG/M2 | TEMPERATURE: 97.6 F

## 2025-07-29 DIAGNOSIS — I74.09 OTHER ARTERIAL EMBOLISM AND THROMBOSIS OF ABDOMINAL AORTA: ICD-10-CM

## 2025-07-29 DIAGNOSIS — I70.208 UNSPECIFIED ATHEROSCLEROSIS OF NATIVE ARTERIES OF EXTREMITIES, OTHER EXTREMITY: ICD-10-CM

## 2025-07-29 DIAGNOSIS — I65.23 OCCLUSION AND STENOSIS OF BILATERAL CAROTID ARTERIES: ICD-10-CM

## 2025-07-29 DIAGNOSIS — I77.1 STRICTURE OF ARTERY: ICD-10-CM

## 2025-07-29 PROCEDURE — 93880 EXTRACRANIAL BILAT STUDY: CPT

## 2025-07-29 PROCEDURE — 93930 UPPER EXTREMITY STUDY: CPT

## 2025-07-29 PROCEDURE — ZZZZZ: CPT

## 2025-07-29 PROCEDURE — 99214 OFFICE O/P EST MOD 30 MIN: CPT

## 2025-07-29 RX ORDER — EMPAGLIFLOZIN 25 MG/1
TABLET, FILM COATED ORAL
Refills: 0 | Status: ACTIVE | COMMUNITY

## 2025-07-29 RX ORDER — CLOPIDOGREL BISULFATE 75 MG/1
75 TABLET, FILM COATED ORAL DAILY
Qty: 90 | Refills: 3 | Status: ACTIVE | COMMUNITY
Start: 2025-07-29 | End: 1900-01-01

## 2025-07-29 RX ORDER — CILOSTAZOL 50 MG/1
50 TABLET ORAL
Qty: 180 | Refills: 3 | Status: ACTIVE | COMMUNITY
Start: 2025-07-29 | End: 1900-01-01

## (undated) DEVICE — FRAZIER SUCTION TIP 8FR

## (undated) DEVICE — TOURNIQUET CUFF 18" DUAL PORT SINGLE BLADDER LUER LOCK (BLACK)

## (undated) DEVICE — WARMING BLANKET UPPER ADULT

## (undated) DEVICE — PREP CHLORAPREP HI-LITE ORANGE 26ML

## (undated) DEVICE — POSITIONER STRAP ARMBOARD VELCRO TS-30

## (undated) DEVICE — DRAPE C ARM MINI

## (undated) DEVICE — TOURNIQUET ESMARK 4"

## (undated) DEVICE — SAW BLADE MICROAIRE SAGITTAL 9.4MMX25.4MMX0.6MM

## (undated) DEVICE — DRSG KLING 4"

## (undated) DEVICE — SYR LUER LOK 10CC

## (undated) DEVICE — SUT ETHILON 4-0 18" P-3

## (undated) DEVICE — DRSG STOCKINETTE TUBULAR 6"

## (undated) DEVICE — PACK LIJ BASIC ORTHO

## (undated) DEVICE — DRSG ADAPTIC 3 X 8"

## (undated) DEVICE — VENODYNE/SCD SLEEVE CALF BARIATRIC

## (undated) DEVICE — DRSG CURITY GAUZE SPONGE 4 X 4" 12-PLY

## (undated) DEVICE — DRSG ACE BANDAGE 4" NS

## (undated) DEVICE — VENODYNE/SCD SLEEVE CALF MEDIUM

## (undated) DEVICE — SUT VICRYL 3-0 27" SH UNDYED

## (undated) DEVICE — ELCTR GROUNDING PAD ADULT COVIDIEN

## (undated) DEVICE — SOL IRR POUR NS 0.9% 500ML

## (undated) DEVICE — GLV 6.5 PROTEXIS (WHITE)

## (undated) DEVICE — LABELS BLANK W PEN

## (undated) DEVICE — CANISTER DISPOSABLE THIN WALL 3000CC

## (undated) DEVICE — NDL HYPO SAFE 18G X 1.5" (PINK)

## (undated) DEVICE — NDL HYPO REGULAR BEVEL 25G X 1.5" (BLUE)

## (undated) DEVICE — BLADE SURGICAL #15 CARBON

## (undated) DEVICE — VENODYNE/SCD SLEEVE CALF LARGE